# Patient Record
Sex: FEMALE
[De-identification: names, ages, dates, MRNs, and addresses within clinical notes are randomized per-mention and may not be internally consistent; named-entity substitution may affect disease eponyms.]

---

## 2018-06-12 LAB
ALBUMIN SERPL-MCNC: 3.7 G/DL (ref 3.5–5)
ALBUMIN/GLOB SERPL: 1.1 {RATIO} (ref 0.8–2)
ALP SERPL-CCNC: 63 IU/L (ref 40–150)
ALT SERPL-CCNC: 23 IU/L (ref 0–55)
ANION GAP SERPL CALC-SCNC: 12.8 MMOL/L (ref 8–16)
BASOPHILS # BLD AUTO: 0 10*3/UL (ref 0–0.1)
BASOPHILS NFR BLD AUTO: 0.6 % (ref 0–1)
BUN SERPL-MCNC: 15 MG/DL (ref 7–26)
BUN/CREAT SERPL: 18 (ref 6–25)
CALCIUM SERPL-MCNC: 9.5 MG/DL (ref 8.4–10.2)
CHLORIDE SERPL-SCNC: 97 MMOL/L (ref 98–107)
CHOLEST SERPL-MCNC: 216 MD/DL (ref 0–199)
CHOLEST/HDLC SERPL: 4.8 {RATIO} (ref 3–3.6)
CO2 SERPL-SCNC: 30 MMOL/L (ref 22–29)
DEPRECATED INR PLAS: 0.99
DEPRECATED NEUTROPHILS # BLD AUTO: 3.7 10*3/UL (ref 2.1–6.9)
EGFRCR SERPLBLD CKD-EPI 2021: > 60 ML/MIN (ref 60–?)
EOSINOPHIL # BLD AUTO: 0.1 10*3/UL (ref 0–0.4)
EOSINOPHIL NFR BLD AUTO: 2 % (ref 0–6)
ERYTHROCYTE [DISTWIDTH] IN CORD BLOOD: 13 % (ref 11.7–14.4)
GLOBULIN PLAS-MCNC: 3.4 G/DL (ref 2.3–3.5)
GLUCOSE SERPLBLD-MCNC: 139 MG/DL (ref 74–118)
HCT VFR BLD AUTO: 35.9 % (ref 34.2–44.1)
HDLC SERPL-MSCNC: 45 MG/DL (ref 40–60)
HGB BLD-MCNC: 12.3 G/DL (ref 12–16)
LDLC SERPL CALC-MCNC: 122 MG/DL (ref 60–130)
LYMPHOCYTES # BLD: 2.4 10*3/UL (ref 1–3.2)
LYMPHOCYTES NFR BLD AUTO: 35.3 % (ref 18–39.1)
MCH RBC QN AUTO: 31.5 PG (ref 28–32)
MCHC RBC AUTO-ENTMCNC: 34.3 G/DL (ref 31–35)
MCV RBC AUTO: 91.8 FL (ref 81–99)
MONOCYTES # BLD AUTO: 0.5 10*3/UL (ref 0.2–0.8)
MONOCYTES NFR BLD AUTO: 7.4 % (ref 4.4–11.3)
NEUTS SEG NFR BLD AUTO: 54.4 % (ref 38.7–80)
PLATELET # BLD AUTO: 236 X10E3/UL (ref 140–360)
POTASSIUM SERPL-SCNC: 3.8 MMOL/L (ref 3.5–5.1)
PROTHROMBIN TIME: 12.3 SECONDS (ref 11.9–14.5)
RBC # BLD AUTO: 3.91 X10E6/UL (ref 3.6–5.1)
SODIUM SERPL-SCNC: 136 MMOL/L (ref 136–145)
TRIGL SERPL-MCNC: 244 MG/DL (ref 0–149)

## 2018-06-13 ENCOUNTER — HOSPITAL ENCOUNTER (OUTPATIENT)
Dept: HOSPITAL 88 - CATH LAB | Age: 68
Discharge: HOME | End: 2018-06-13
Attending: INTERNAL MEDICINE
Payer: MEDICARE

## 2018-06-13 VITALS — HEIGHT: 62 IN | BODY MASS INDEX: 30.18 KG/M2 | WEIGHT: 164 LBS

## 2018-06-13 VITALS — SYSTOLIC BLOOD PRESSURE: 139 MMHG | DIASTOLIC BLOOD PRESSURE: 64 MMHG

## 2018-06-13 VITALS — SYSTOLIC BLOOD PRESSURE: 127 MMHG | DIASTOLIC BLOOD PRESSURE: 54 MMHG

## 2018-06-13 VITALS — DIASTOLIC BLOOD PRESSURE: 56 MMHG | SYSTOLIC BLOOD PRESSURE: 136 MMHG

## 2018-06-13 VITALS — SYSTOLIC BLOOD PRESSURE: 141 MMHG | DIASTOLIC BLOOD PRESSURE: 59 MMHG

## 2018-06-13 VITALS — DIASTOLIC BLOOD PRESSURE: 72 MMHG | SYSTOLIC BLOOD PRESSURE: 170 MMHG

## 2018-06-13 VITALS — DIASTOLIC BLOOD PRESSURE: 63 MMHG | SYSTOLIC BLOOD PRESSURE: 126 MMHG

## 2018-06-13 VITALS — DIASTOLIC BLOOD PRESSURE: 73 MMHG | SYSTOLIC BLOOD PRESSURE: 177 MMHG

## 2018-06-13 VITALS — DIASTOLIC BLOOD PRESSURE: 59 MMHG | SYSTOLIC BLOOD PRESSURE: 129 MMHG

## 2018-06-13 VITALS — DIASTOLIC BLOOD PRESSURE: 57 MMHG | SYSTOLIC BLOOD PRESSURE: 138 MMHG

## 2018-06-13 DIAGNOSIS — Z95.1: ICD-10-CM

## 2018-06-13 DIAGNOSIS — I83.893: ICD-10-CM

## 2018-06-13 DIAGNOSIS — I70.213: ICD-10-CM

## 2018-06-13 DIAGNOSIS — Z95.820: ICD-10-CM

## 2018-06-13 DIAGNOSIS — Z95.5: ICD-10-CM

## 2018-06-13 DIAGNOSIS — I25.708: Primary | ICD-10-CM

## 2018-06-13 DIAGNOSIS — Z01.810: ICD-10-CM

## 2018-06-13 DIAGNOSIS — I70.92: ICD-10-CM

## 2018-06-13 DIAGNOSIS — R94.39: ICD-10-CM

## 2018-06-13 DIAGNOSIS — Z79.02: ICD-10-CM

## 2018-06-13 DIAGNOSIS — E78.5: ICD-10-CM

## 2018-06-13 DIAGNOSIS — R01.1: ICD-10-CM

## 2018-06-13 DIAGNOSIS — Z01.812: ICD-10-CM

## 2018-06-13 DIAGNOSIS — E11.8: ICD-10-CM

## 2018-06-13 DIAGNOSIS — Z79.84: ICD-10-CM

## 2018-06-13 DIAGNOSIS — E66.9: ICD-10-CM

## 2018-06-13 DIAGNOSIS — I25.82: ICD-10-CM

## 2018-06-13 DIAGNOSIS — I10: ICD-10-CM

## 2018-06-13 DIAGNOSIS — Z79.82: ICD-10-CM

## 2018-06-13 DIAGNOSIS — Z82.49: ICD-10-CM

## 2018-06-13 PROCEDURE — 36200 PLACE CATHETER IN AORTA: CPT

## 2018-06-13 PROCEDURE — 37228: CPT

## 2018-06-13 PROCEDURE — 93459 L HRT ART/GRFT ANGIO: CPT

## 2018-06-13 PROCEDURE — 36140 INTRO NDL ICATH UPR/LXTR ART: CPT

## 2018-06-13 PROCEDURE — 75630 X-RAY AORTA LEG ARTERIES: CPT

## 2018-06-13 PROCEDURE — 80061 LIPID PANEL: CPT

## 2018-06-13 PROCEDURE — 85610 PROTHROMBIN TIME: CPT

## 2018-06-13 PROCEDURE — 75710 ARTERY X-RAYS ARM/LEG: CPT

## 2018-06-13 PROCEDURE — 93005 ELECTROCARDIOGRAM TRACING: CPT

## 2018-06-13 PROCEDURE — 37232: CPT

## 2018-06-13 PROCEDURE — 80053 COMPREHEN METABOLIC PANEL: CPT

## 2018-06-13 PROCEDURE — 36415 COLL VENOUS BLD VENIPUNCTURE: CPT

## 2018-06-13 PROCEDURE — 85025 COMPLETE CBC W/AUTO DIFF WBC: CPT

## 2018-06-13 NOTE — OPERATIVE REPORT
DATE OF PROCEDURE:  June 13, 2018 

 

PROCEDURE PERFORMED:   Cardiac catheterization.



PROCEDURE INDICATIONS

1. Angina pectoris, CCS-III, stable with an abnormal stress test done at 

outside hospital.

2. Life-limiting claudication affecting left more than right lower 

extremity.



PROCEDURES PERFORMED

1. Left heart catheterization.

2. Selective coronary angiography.

3. Left internal mammary artery to left anterior descending graft 

angiography.

4. Aortogram of descending aorta.

5. Abdominal aortogram.

6. Selective lower extremity angiography, bilateral.

7. Third-order catheter placement from right common femoral artery to 

left common femoral artery.

8. Additional third-order catheter placement from right common femoral 

artery to the left popliteal artery for below-the-knee vessel 

angiography of the left lower extremity due to an issue with poorly 

visualized vessel.

9. Left anterior tibial percutaneous transluminal angioplasty

10. Right common femoral artery 6-Bhutanese Angio-Seal closure.



PROCEDURE COMPLICATIONS:  None.



ESTIMATED BLOOD LOSS:  Less than 15 mL.



PROCEDURE SUMMARY:  After consent was obtained, patient was prepped and 

draped in a sterile fashion and the right femoral site was locally 

infiltrated with 2% lidocaine. Access was obtained with micropuncture, and 

a short 6-Bhutanese sheath was placed. All catheters were railed to their 

respective positions over a leading wire for the interventional portion of 

the procedure, and heparin was used to maintain an ACT over 250. Aspirin 

and Plavix were also provided. 



1. The LV pressure was 201/12 with end-diastolic pressure of 30. 

2. The aortic pressure was 197/74. 

3. LV-gram reveals preserved LV systolic function, normal regional wall 

motion and a left ventricular ejection fraction of 60% to 65%. 

4. Left main large in caliber with a 20% distal stenosis gives an LAD 

and a circumflex.

5. The LAD has proximal luminal irregularities. In the distal portion 

after a diagonal stent, the LAD has 100% stenosis. Distal to the 

stenosis there is patent filling of the LAD from a LIMA to LAD which is 

patent. Collaterals arise from the distal LAD fed by the LIMA. These 

collaterals fill the RPDA and RPLV and are grade 2 collaterals. 

6. The circumflex has 40% mid calcific stenosis, gives a left atrial 

branch and 2 obtuse marginals.

7. The right coronary artery is dominant. In the proximal to midportion 

it is 100% stenosed with distal filling of the midsegment of the RCA 

from bridging collaterals from the proximal RCA. The distal RCA is 

occluded again 100%. Distal to this the RPDA and RPLV fill from 

collaterals on the distal LAD via the LIMA.

8. No additional grafts were noted other than an occluded graft to RCA 

observed on aortogram. No other patent grafts other than the LIMA, which 

is patent, were observed.



The abdominal aorta infrarenal has a 40% extrinsic calcific lesion and 

gives off the iliac arteries. The iliac arteries each have 30% stenosis. 

There is a back orthopedic hardware noted. Therefore, orthogonal views were 

performed to confirm patency of the iliac arteries. The internal and 

external iliac arteries have luminal irregularities filled with the common 

femoral and profunda femoris, SFAs and popliteal arteries bilaterally with 

a stenosis less than 30% _______. The proximal segment of the right 

anterior tibial, TP trunk, peroneal and posterior tibial artery seems 

patent. However, the mid to distal segment of these vessels are not well 

visualized. The left anterior tibial in the midportion had 100% chronic 

total occlusion. This was revascularized with a resultant less than 10% 

residual stenosis, no dissection, no perforation, SHARON 3 flow post 

procedure. TP trunk, peroneal artery and posterior tibial artery on the 

left are patent with luminal irregularities. For the interventional portion 

of the procedure, a Roadrunner wire was used to advance a Seeker catheter 

into the proximal stent of the left anterior tibial. This was exchanged for 

a Runthrough wire, which would continue to engage collaterals at the  

proximal _______. This was, therefore, exchanged further for a _______ 

wire, which was able to cross adequately the area of stenosis. A Seeker 

catheter was advanced past the occlusion and intraluminal catheter position 

confirmed; therefore, exchanged for a [place] wire over which a 3.0 x 

150 Ultraverse balloon was inflated across the area of stenosis with 

resultant excellent results as described above. At the end of the 

procedure, a right common femoral artery 6-Bhutanese Angio-Seal was deployed, 

achieving hemostasis.



CONCLUSION:  Patent left internal mammary artery to left anterior 

descending. Chronic total occlusion of the right coronary artery with 

filling via collaterals. Uncontrolled hypertension and peripheral arterial 

disease, now status post left anterior tibial percutaneous transluminal 

angioplasty.



RECOMMENDATIONS:  Aggressive medical therapy. Continue medical management 

for coronary artery disease and peripheral arterial disease including 

aspirin, statin, beta blocker therapy. Add clopidogrel for patient status 

post revascularization of the left anterior tibial. Follow up in the office 

in 1 to 2 weeks. 











DD:  06/13/2018 17:40

DT:  06/13/2018 18:02

Job#:  [id number]

## 2018-10-25 ENCOUNTER — HOSPITAL ENCOUNTER (OUTPATIENT)
Dept: HOSPITAL 88 - CT | Age: 68
End: 2018-10-25
Attending: INTERNAL MEDICINE
Payer: MEDICARE

## 2018-10-25 DIAGNOSIS — I73.9: Primary | ICD-10-CM

## 2018-10-25 LAB
BUN SERPL-MCNC: 11 MG/DL (ref 7–26)
BUN/CREAT SERPL: 14 (ref 6–25)
EGFRCR SERPLBLD CKD-EPI 2021: > 60 ML/MIN (ref 60–?)

## 2018-10-25 PROCEDURE — 36415 COLL VENOUS BLD VENIPUNCTURE: CPT

## 2018-10-25 PROCEDURE — 84520 ASSAY OF UREA NITROGEN: CPT

## 2018-10-25 PROCEDURE — 82565 ASSAY OF CREATININE: CPT

## 2018-10-25 PROCEDURE — 75635 CT ANGIO ABDOMINAL ARTERIES: CPT

## 2018-10-26 NOTE — DIAGNOSTIC IMAGING REPORT
CTA of the abdomen and pelvis and bilateral lower extremities with contrast



History: Leg pain, history of lower extremity angiography 06/2018.



Comparison: <None available>.



Technique: Multidetector CT scanning of the abdomen and pelvis and bilateral

lower extremities was administered in arterial phase after intravenous

administration of 100 cc of Isovue 370.  No oral contrast was given. Sagittal

and coronal multiplanar as well as volume rendering reformations were obtained.





Discussion:

Vascular findings:

Abdominal aorta and proximal branches: 

Streak artifact from spinal hardware somewhat limits evaluation of the mid

abdominal aorta. There are extensive atherosclerotic calcifications involving

the abdominal aorta, and moderate atherosclerotic calcifications of the branch

vessels. No evidence of aortic aneurysm or dissection. 



Celiac artery, SMA, and SANG origins appear patent. There are two renal arteries

bilaterally. The bilateral common, external, and internal iliac arteries appear

patent. 



Bilateral lower extremity runoff: 

Right lower extremity: Mild atherosclerotic changes of the common femoral

artery without stenosis. Moderate atherosclerotic changes of the superficial

femoral artery with a focal severe stenosis on axial image 160 and mild to

moderate multifocal stenoses elsewhere. The deep femoral artery is patent. The

popliteal artery demonstrates mild atherosclerotic changes without stenosis.

The peroneal artery is patent throughout its course but diminutive distally.

The posterior tibial artery is patent throughout its course. Mild non-specific

soft tissue stranding in the lower leg. 



Left lower extremity: Mild atherosclerotic changes of the common and

superficial femoral arteries without stenosis. The deep femoral artery is

patent. The popliteal artery demonstrates mild atherosclerotic changes without

stenosis. Anterior tibial artery demonstrates moderate to severe stenoses in

the proximal and mid portion but is patent distally and supplies a patent

dorsalis pedis artery. The peroneal artery is patent in the proximal and mid

portion but not visualized distally at the level of the distal tibia. The

posterior tibial artery demonstrates mild multifocal stenoses but is patent to

the foot. There are surgical clips along the soft tissues of the left lower leg

medially. Mild non-specific soft tissue stranding in the lower leg. 



Abdomen:

Scattered dependent atelectatic changes. Four chamber cardiomegaly. Coronary

atherosclerosis. 



No focal hepatic lesions or biliary ductal dilatation. Status post

cholecystectomy. 



The spleen, pancreas, adrenal glands, and right kidney are normal. There is a

renal cortical defect on the left. The small bowel, and large bowel are

unremarkable. Small hiatal hernia. Fat containing umbilical hernia. Normal

appendix. There is no evidence of adenopathy or free fluid. 



Posterior decompression with spinal fusion from L3-L5 with intervertebral

spacers with extensive streak artifact. No evidence of acute bony abnormality. 



Pelvis:

The bladder is unremarkable. There is no evidence of adenopathy or free fluid.

Fibroid uterus. 



IMPRESSION:

1) Extensive atherosclerotic changes of the abdominal aorta without evidence of

aneurysm or dissection. 

2) Focal severe stenosis of the right superficial femoral artery. Patent three

vessel runoff to the right lower extremity. 

3) Mild to moderate atherosclerotic changes in the left lower extremity.

Anterior and posterior tibial arteries supply the runoff to the left lower

extremity. Patent proximal and mid peroneal artery with non-visualization of

the distal portion which may reflect occlusion or slow flow. 



Signed by: Dr. Mo Barahona MD on 10/26/2018 7:56 AM

## 2018-11-07 LAB
ALBUMIN SERPL-MCNC: 3.6 G/DL (ref 3.5–5)
ALBUMIN/GLOB SERPL: 1.2 {RATIO} (ref 0.8–2)
ALP SERPL-CCNC: 51 IU/L (ref 40–150)
ALT SERPL-CCNC: 15 IU/L (ref 0–55)
ANION GAP SERPL CALC-SCNC: 13.8 MMOL/L (ref 8–16)
BASOPHILS # BLD AUTO: 0 10*3/UL (ref 0–0.1)
BASOPHILS NFR BLD AUTO: 0.7 % (ref 0–1)
BUN SERPL-MCNC: 13 MG/DL (ref 7–26)
BUN/CREAT SERPL: 16 (ref 6–25)
CALCIUM SERPL-MCNC: 9.6 MG/DL (ref 8.4–10.2)
CHLORIDE SERPL-SCNC: 96 MMOL/L (ref 98–107)
CHOLEST SERPL-MCNC: 213 MD/DL (ref 0–199)
CHOLEST/HDLC SERPL: 4.5 {RATIO} (ref 3–3.6)
CO2 SERPL-SCNC: 27 MMOL/L (ref 22–29)
DEPRECATED INR PLAS: 0.89
DEPRECATED NEUTROPHILS # BLD AUTO: 3 10*3/UL (ref 2.1–6.9)
EGFRCR SERPLBLD CKD-EPI 2021: > 60 ML/MIN (ref 60–?)
EOSINOPHIL # BLD AUTO: 0.1 10*3/UL (ref 0–0.4)
EOSINOPHIL NFR BLD AUTO: 1.9 % (ref 0–6)
ERYTHROCYTE [DISTWIDTH] IN CORD BLOOD: 12.6 % (ref 11.7–14.4)
GLOBULIN PLAS-MCNC: 3.1 G/DL (ref 2.3–3.5)
GLUCOSE SERPLBLD-MCNC: 123 MG/DL (ref 74–118)
HCT VFR BLD AUTO: 35 % (ref 34.2–44.1)
HDLC SERPL-MSCNC: 47 MG/DL (ref 40–60)
HGB BLD-MCNC: 11.7 G/DL (ref 12–16)
LDLC SERPL CALC-MCNC: 131 MG/DL (ref 60–130)
LYMPHOCYTES # BLD: 1.9 10*3/UL (ref 1–3.2)
LYMPHOCYTES NFR BLD AUTO: 32.5 % (ref 18–39.1)
MCH RBC QN AUTO: 30.8 PG (ref 28–32)
MCHC RBC AUTO-ENTMCNC: 33.4 G/DL (ref 31–35)
MCV RBC AUTO: 92.1 FL (ref 81–99)
MONOCYTES # BLD AUTO: 0.7 10*3/UL (ref 0.2–0.8)
MONOCYTES NFR BLD AUTO: 11.3 % (ref 4.4–11.3)
NEUTS SEG NFR BLD AUTO: 52.9 % (ref 38.7–80)
PLATELET # BLD AUTO: 234 X10E3/UL (ref 140–360)
POTASSIUM SERPL-SCNC: 3.8 MMOL/L (ref 3.5–5.1)
PROTHROMBIN TIME: 12.9 SECONDS (ref 11.9–14.5)
RBC # BLD AUTO: 3.8 X10E6/UL (ref 3.6–5.1)
SODIUM SERPL-SCNC: 133 MMOL/L (ref 136–145)
TRIGL SERPL-MCNC: 176 MG/DL (ref 0–149)

## 2018-11-08 ENCOUNTER — HOSPITAL ENCOUNTER (OUTPATIENT)
Dept: HOSPITAL 88 - CATH LAB | Age: 68
Discharge: HOME | End: 2018-11-08
Attending: INTERNAL MEDICINE
Payer: MEDICARE

## 2018-11-08 VITALS — HEIGHT: 61 IN | BODY MASS INDEX: 33.04 KG/M2 | WEIGHT: 175 LBS

## 2018-11-08 VITALS — DIASTOLIC BLOOD PRESSURE: 58 MMHG | SYSTOLIC BLOOD PRESSURE: 115 MMHG

## 2018-11-08 VITALS — DIASTOLIC BLOOD PRESSURE: 53 MMHG | SYSTOLIC BLOOD PRESSURE: 135 MMHG

## 2018-11-08 VITALS — SYSTOLIC BLOOD PRESSURE: 124 MMHG | DIASTOLIC BLOOD PRESSURE: 53 MMHG

## 2018-11-08 VITALS — DIASTOLIC BLOOD PRESSURE: 67 MMHG | SYSTOLIC BLOOD PRESSURE: 134 MMHG

## 2018-11-08 VITALS — DIASTOLIC BLOOD PRESSURE: 64 MMHG | SYSTOLIC BLOOD PRESSURE: 104 MMHG

## 2018-11-08 VITALS — DIASTOLIC BLOOD PRESSURE: 65 MMHG | SYSTOLIC BLOOD PRESSURE: 123 MMHG

## 2018-11-08 VITALS — DIASTOLIC BLOOD PRESSURE: 57 MMHG | SYSTOLIC BLOOD PRESSURE: 144 MMHG

## 2018-11-08 VITALS — SYSTOLIC BLOOD PRESSURE: 156 MMHG | DIASTOLIC BLOOD PRESSURE: 67 MMHG

## 2018-11-08 VITALS — DIASTOLIC BLOOD PRESSURE: 54 MMHG | SYSTOLIC BLOOD PRESSURE: 123 MMHG

## 2018-11-08 VITALS — SYSTOLIC BLOOD PRESSURE: 137 MMHG | DIASTOLIC BLOOD PRESSURE: 79 MMHG

## 2018-11-08 DIAGNOSIS — I10: ICD-10-CM

## 2018-11-08 DIAGNOSIS — Z82.49: ICD-10-CM

## 2018-11-08 DIAGNOSIS — I70.213: Primary | ICD-10-CM

## 2018-11-08 DIAGNOSIS — E11.69: ICD-10-CM

## 2018-11-08 DIAGNOSIS — Z79.82: ICD-10-CM

## 2018-11-08 DIAGNOSIS — E66.9: ICD-10-CM

## 2018-11-08 DIAGNOSIS — Z01.812: ICD-10-CM

## 2018-11-08 DIAGNOSIS — Z79.02: ICD-10-CM

## 2018-11-08 DIAGNOSIS — Z79.84: ICD-10-CM

## 2018-11-08 DIAGNOSIS — E78.5: ICD-10-CM

## 2018-11-08 DIAGNOSIS — I83.893: ICD-10-CM

## 2018-11-08 DIAGNOSIS — Z01.810: ICD-10-CM

## 2018-11-08 DIAGNOSIS — I25.118: ICD-10-CM

## 2018-11-08 PROCEDURE — 85025 COMPLETE CBC W/AUTO DIFF WBC: CPT

## 2018-11-08 PROCEDURE — 36247 INS CATH ABD/L-EXT ART 3RD: CPT

## 2018-11-08 PROCEDURE — 75625 CONTRAST EXAM ABDOMINL AORTA: CPT

## 2018-11-08 PROCEDURE — 37225: CPT

## 2018-11-08 PROCEDURE — 85610 PROTHROMBIN TIME: CPT

## 2018-11-08 PROCEDURE — 80053 COMPREHEN METABOLIC PANEL: CPT

## 2018-11-08 PROCEDURE — 37186 SEC ART THROMBECTOMY ADD-ON: CPT

## 2018-11-08 PROCEDURE — 75716 ARTERY X-RAYS ARMS/LEGS: CPT

## 2018-11-08 PROCEDURE — 36415 COLL VENOUS BLD VENIPUNCTURE: CPT

## 2018-11-08 PROCEDURE — 80061 LIPID PANEL: CPT

## 2018-11-08 PROCEDURE — 93005 ELECTROCARDIOGRAM TRACING: CPT

## 2018-11-08 NOTE — XMS REPORT
Patient Summary Document

                             Created on: 2018



PANCHO NINO

External Reference #: [id number]

: 1950

Sex: Female



Demographics







                          Address                   [address] [place] [address]

 

                          Home Phone                [phone]

 

                          Preferred Language        Unknown

 

                          Marital Status            Unknown

 

                          Buddhist Affiliation     Unknown

 

                          Race                      Unknown

 

                                        Additional Race(s)  

 

                          Ethnic Group              Unknown





Author







                          Author                    [facility]

 

                          [facility]

 

                          Address                   Unknown

 

                          Phone                     Unavailable







Care Team Providers







                    Care Team Member Name    Role                Phone

 

                    PAUL HUGHES     Unavailable         Unavailable







Problems

This patient has no known problems.



Allergies, Adverse Reactions, Alerts

This patient has no known allergies or adverse reactions.



Medications

This patient has no known medications.



Results







           Test Description    Test Time    Test Comments    Text Results    Atomic Results    Result

 Comments

 

                CTA ABD/PEL/RUN OFF    2018-10-26 07:27:00                                                       

                                                   [place] [address]      Patient Name: PANCHO NINO                                   MR 
#: [id number]                     : 1950                               
   Age/Sex: 68/F  Acct #: [id number]                              Req #: [id number]
[id number]  Adm Physician:                                                      
Ordered by: HEIKE HUGHES MD                            Report #: [id number]   
    Location: CT                                      Room/Bed:                 
   
___________________________________________________________________________________________________
   Procedure: [id number] CT/CTA ABD/PEL/RUN OFF  Exam Date: 10/25/18             
              Exam Time: 1700                                              
REPORT STATUS: Signed    CTA of the abdomen and pelvis and bilateral lower 
extremities with contrast      History: Leg pain, history of lower extremity 
angiography 2018.      Comparison: <None available>.      Technique: 
Multidetector CT scanning of the abdomen and pelvis and bilateral   lower 
extremities was administered in arterial phase after intravenous   
administration of 100 cc of Isovue 370.  No oral contrast was given. Sagittal   
and coronal multiplanar as well as volume rendering reformations were obtained. 
       Discussion:   Vascular findings:   Abdominal aorta and proximal branches:
   Streak artifact from spinal hardware somewhat limits evaluation of the mid   
abdominal aorta. There are extensive atherosclerotic calcifications involving   
the abdominal aorta, and moderate atherosclerotic calcifications of the branch  
vessels. No evidence of aortic aneurysm or dissection.       Celiac artery, SMA,
and SANG origins appear patent. There are two renal arteries   bilaterally. The 
bilateral common, external, and internal iliac arteries appear   patent.       
Bilateral lower extremity runoff:    Right lower extremity: Mild atherosclerotic
changes of the common femoral   artery without stenosis. Moderate 
atherosclerotic changes of the superficial   femoral artery with a focal severe 
stenosis on axial image 160 and mild to   moderate multifocal stenoses 
elsewhere. The deep femoral artery is patent. The   popliteal artery 
demonstrates mild atherosclerotic changes without stenosis.   The peroneal 
artery is patent throughout its course but diminutive distally.   The posterior 
tibial artery is patent throughout its course. Mild non-specific   soft tissue 
stranding in the lower leg.       Left lower extremity: Mild atherosclerotic 
changes of the common and   superficial femoral arteries without stenosis. The 
deep femoral artery is   patent. The popliteal artery demonstrates mild 
atherosclerotic changes without   stenosis. Anterior tibial artery demonstrates 
moderate to severe stenoses in   the proximal and mid portion but is patent 
distally and supplies a patent   dorsalis pedis artery. The peroneal artery is 
patent in the proximal and mid   portion but not visualized distally at the 
level of the distal tibia. The   posterior tibial artery demonstrates mild 
multifocal stenoses but is patent to   the foot. There are surgical clips along 
the soft tissues of the left lower leg   medially. Mild non-specific soft tissue
stranding in the lower leg.       Abdomen:   Scattered dependent atelectatic 
changes. Four chamber cardiomegaly. Coronary   atherosclerosis.       No focal 
hepatic lesions or biliary ductal dilatation. Status post   cholecystectomy.    
  The spleen, pancreas, adrenal glands, and right kidney are normal. There is a 
 renal cortical defect on the left. The small bowel, and large bowel are   
unremarkable. Small hiatal hernia. Fat containing umbilical hernia. Normal   
appendix. There is no evidence of adenopathy or free fluid.       Posterior 
decompression with spinal fusion from L3-L5 with intervertebral   spacers with 
extensive streak artifact. No evidence of acute bony abnormality.       Pelvis: 
 The bladder is unremarkable. There is no evidence of adenopathy or free fluid. 
 Fibroid uterus.       IMPRESSION:   1) Extensive atherosclerotic changes of the
abdominal aorta without evidence of   aneurysm or dissection.    2) Focal severe
stenosis of the right superficial femoral artery. Patent three   vessel runoff 
to the right lower extremity.    3) Mild to moderate atherosclerotic changes in 
the left lower extremity.   Anterior and posterior tibial arteries supply the 
runoff to the left lower   extremity. Patent proximal and mid peroneal artery 
with non-visualization of   the distal portion which may reflect occlusion or 
slow flow.       Signed by: Dr. Farhan Arndt MD on 10/26/2018 7:56 AM        
Dictated By: FARHAN ARNDT MD  Electronically Signed By: FARHAN ARNDT MD on 10/26/18 
0756  Transcribed By: MARIA C on 10/26/18 0756       COPY TO:   HEIKE HUGHES MD

## 2018-11-09 NOTE — OPERATIVE REPORT
DATE OF PROCEDURE:  November 08, 2018 

 

PROCEDURES PERFORMED

1. Conscious sedation 1 hour.

2. Abdominal aortography.

3. Third-order peripheral angiography of the right lower extremity.

4. First-order peripheral angiography of the left lower extremity.

5. Orbital atherectomy and percutaneous transluminal angioplasty of the 

right superficial femoral artery.



PREPROCEDURE DIAGNOSIS:   Peripheral arterial disease with claudication.



POSTPROCEDURE DIAGNOSIS:   Peripheral arterial disease with claudication.



ESTIMATED BLOOD LOSS:  Less than 20 mL.



SPECIMENS REMOVED:  None.



PROCEDURE DETAILS:  After informed consent was obtained, the patient was 

brought to the cardiac catheterization laboratory in a fasting and 

nonsedated state. Bilateral groins were prepped and draped in the usual 

sterile fashion. Lidocaine 2% was infiltrated over the left anterior groin 

for local anesthesia. Using a micropuncture needle, the left common femoral 

artery was accessed using modified Seldinger technique and a 5-Barbadian 

sheath was placed. Next, an Omni Flush catheter was advanced over the wire 

and situated in the infrarenal abdominal aorta. Abdominal aortography was 

performed. Next, the same catheter was used to cross up and over into the 

right superficial femoral artery position, and diagnostic angiography was 

performed. This revealed a significant 80% to 90% stenosis of the right 

superficial femoral artery. A decision was made to treat this lesion. Next, 

I crossed an up-and-over 6-Barbadian sheath. The lesion was crossed with a 

ViperWire, and multiple runs of orbital atherectomy were performed. Next, 

the lesion was predilated with a 5 mm balloon, then subsequently with a 5 

mm Lutonix drug-coated balloon. Final angiography revealed a mild to 

non-flow-limiting dissection which will be treated medically. The sheath 

was exchanged. The closure site was closed with a Mynx device. The patient 

tolerated the procedure well with no immediate complications and was 

transferred back to her room in stable condition.



PROCEDURAL FINDINGS

1. The infrarenal abdominal aorta is free of aneurysm or dissection.

2. The iliac system is patent.

3. The right common femoral, profunda femoris are patent. The mid right 

SFA has an 80% to 90% stenosis. 

4. The right popliteal artery is patent, and there is 3-vessel runoff.

5. The left femoral, popliteal system is patent. The left anterior 

tibial has a 50% to 60% mid stenosis. The left posterior tibial has a 

50% to 60% mid stenosis. The left peroneal is patent.



IMPRESSION AND PLAN:   This is a 68-year-old woman who presented with 

claudication symptoms and known peripheral arterial disease. She underwent 

successful atherectomy and percutaneous transluminal angioplasty of the 

right superficial femoral artery. 











DD:  11/09/2018 09:48

DT:  11/09/2018 11:31

Job#:  [id number]

## 2019-04-22 ENCOUNTER — HOSPITAL ENCOUNTER (OUTPATIENT)
Dept: HOSPITAL 88 - CT | Age: 69
End: 2019-04-22
Attending: INTERNAL MEDICINE
Payer: MEDICARE

## 2019-04-22 DIAGNOSIS — I73.9: Primary | ICD-10-CM

## 2019-04-22 LAB
BUN SERPL-MCNC: 21 MG/DL (ref 7–26)
BUN/CREAT SERPL: 24 (ref 6–25)
EGFRCR SERPLBLD CKD-EPI 2021: > 60 ML/MIN (ref 60–?)

## 2019-04-22 PROCEDURE — 36415 COLL VENOUS BLD VENIPUNCTURE: CPT

## 2019-04-22 PROCEDURE — 84520 ASSAY OF UREA NITROGEN: CPT

## 2019-04-22 PROCEDURE — 75635 CT ANGIO ABDOMINAL ARTERIES: CPT

## 2019-04-22 PROCEDURE — 82565 ASSAY OF CREATININE: CPT

## 2019-04-22 NOTE — DIAGNOSTIC IMAGING REPORT
EXAMINATION: CTA abdomen and pelvis and lower extremities with contrast.



TECHNIQUE: 

Spiral CT images of the abdomen and pelvis were performed from the lung bases

to the feet after the intravenous administration of 100 cc Isovue-370. Coronal

and sagittal reformatted images were obtained.



For optimization of anatomic definition, volume rendered 3-D reconstructed

images were generated on a stand-alone workstation under direct supervision of

the interpreting physician.



COMPARISON: 10/25/2018



CLINICAL HISTORY:Peripheral vascular disease

     

DISCUSSION:



Vasculature: The abdominal aorta is not aneurysmal. Atherosclerotic

calcification of the abdominal aorta and major branch vessel origins. Celiac

axis is patent with conventional celiac anatomy. SMA origin is patent. 2 renal

arteries supply each kidney, the origins of which are patent. The SANG origin is

patent. The infrarenal abdominal aorta is slightly limited just above the

bifurcation secondary to streak artifact from lumbar spine hardware.



Iliac inflow:

Left: Calcified and noncalcified atherosclerotic plaque throughout without

significant common or external iliac stenosis. The internal iliac artery and

proximal visceral branches are patent.

Right: Calcified atherosclerotic plaque without significant stenosis of the

common or external iliac artery. Internal iliac artery and proximal visceral

branches are patent.



Femoral-popliteal segments:

Left: The common femoral artery is patent. The femoral bifurcation is patent.

The profunda femoris artery and proximal muscular branches are patent. There is

short segment mild stenosis of the proximal SFA. Otherwise diffuse calcified

and noncalcified atherosclerotic plaque of the SFA without significant

stenosis. The above-knee and below-knee segments of popliteal artery are patent

Right: Common femoral artery is patent. The femoral bifurcation is patent. The

profunda femoris artery and proximal muscular branches are patent. Calcified

and noncalcified atherosclerotic plaque along the course of the SFA. A short

segment moderate stenosis proximally. Previously described focal severe

stenosis of the SFA at the adductor hiatus has probably been treated by

angioplasty in the interim, without significant residual stenosis (see image

153). The popliteal artery is patent.



Runoff:

Left: The anterior tibial artery is patent and is visualized to its

continuation as the dorsalis pedis artery. The tibioperoneal trunk is patent.

The peroneal artery is patent to its expected termination at the ankle joint.

The posterior tibial artery is patent to its continuation as the lateral

plantar artery.

Right: The anterior tibial artery is patent and is visualized to its

continuation as the dorsalis pedis artery. The tibioperoneal trunk is patent.

The peroneal artery is patent to its termination above the ankle joint.

Posterior tibial artery is patent and is visualized to its continuation as the

lateral plantar artery.



ABDOMEN/PELVIS:



LOWER THORAX:Unremarkable.



HEPATOBILIARY: No focal hepatic lesions.  No intra-or extrahepatic biliary

ductal dilation.  Gallbladder has been resected. 



SPLEEN: Heterogeneity of splenic attenuation reflects arterial phase of scan.



PANCREAS: No focal masses or ductal dilatation. 



ADRENALS: No adrenal nodules.



KIDNEYS/URETERS: Multiple cortical defects in the posterior left kidney which

may relate to prior infectious or inflammatory process. No solid renal mass

lesion. No hydronephrosis.



PELVIC ORGANS/BLADDER: The urinary bladder is incompletely distended but

otherwise unremarkable. Calcified fundal uterine fibroid. No adnexal mass.



PERITONEUM/RETROPERITONEUM: No ascites. No pneumoperitoneum.



LYMPH NODES: No inguinal, pelvic sidewall, retroperitoneal, or mesenteric

lymphadenopathy.



VESSELS: As above.



GI TRACT: The large bowel shows no evidence of distention or wall thickening.

There are a few diverticula scattered along the course of the sigmoid colon

without wall thickening or inflammatory change. Apparent wall thickening of the

descending colon is likely related to poor distention. The appendix is normal.

Small hiatal hernia. The stomach is collapsed with prominent rugal folds. No

small bowel dilatation to suggest obstruction.



BONES AND SOFT TISSUE: Postsurgical changes related to mesh repair of ventral

abdominal wall hernia. Right mid abdominal paramedian ventral hernia with a

neck measuring 4.8 cm transversely seen on series 3 image 77. Hernia sac

contains only omental vessels without inflammatory change. Multiple surgical

clips in the medial soft tissues of the left upper leg, likely related to

venous graft harvest. No osseous destructive lesions. Intact lumbar spine

fusion hardware.     



IMPRESSION: 



Diffuse atherosclerotic vascular disease of the abdominal, pelvic and lower

extremity systems. No abdominal aortic aneurysm or significant visceral branch

stenosis.



No significant iliac inflow stenosis.



Probable interval angioplasty of previously described short segment severe

stenosis of the right superficial femoral artery at the adductor hiatus,

without significant residual stenosis. Otherwise no significant

femoral-popliteal stenosis.



Conventional three-vessel lower extremity runoff bilaterally.



Signed by: Dr. Jose Garza M.D. on 4/22/2019 2:27 PM

## 2020-03-16 LAB
ALBUMIN SERPL-MCNC: 3.8 G/DL (ref 3.5–5)
ALBUMIN/GLOB SERPL: 1.2 {RATIO} (ref 0.8–2)
ALP SERPL-CCNC: 57 IU/L (ref 40–150)
ALT SERPL-CCNC: 13 IU/L (ref 0–55)
ANION GAP SERPL CALC-SCNC: 9 MMOL/L (ref 8–16)
BASOPHILS # BLD AUTO: 0 10*3/UL (ref 0–0.1)
BASOPHILS NFR BLD AUTO: 0.5 % (ref 0–1)
BUN SERPL-MCNC: 17 MG/DL (ref 7–26)
BUN/CREAT SERPL: 20 (ref 6–25)
CALCIUM SERPL-MCNC: 8.8 MG/DL (ref 8.4–10.2)
CHLORIDE SERPL-SCNC: 104 MMOL/L (ref 98–107)
CO2 SERPL-SCNC: 28 MMOL/L (ref 22–29)
DEPRECATED INR PLAS: 0.92
DEPRECATED NEUTROPHILS # BLD AUTO: 3.5 10*3/UL (ref 2.1–6.9)
EGFRCR SERPLBLD CKD-EPI 2021: > 60 ML/MIN (ref 60–?)
EOSINOPHIL # BLD AUTO: 0.2 10*3/UL (ref 0–0.4)
EOSINOPHIL NFR BLD AUTO: 2.4 % (ref 0–6)
ERYTHROCYTE [DISTWIDTH] IN CORD BLOOD: 13.4 % (ref 11.7–14.4)
GLOBULIN PLAS-MCNC: 3.2 G/DL (ref 2.3–3.5)
GLUCOSE SERPLBLD-MCNC: 149 MG/DL (ref 74–118)
HCT VFR BLD AUTO: 33.4 % (ref 34.2–44.1)
HGB BLD-MCNC: 10.9 G/DL (ref 12–16)
LYMPHOCYTES # BLD: 1.8 10*3/UL (ref 1–3.2)
LYMPHOCYTES NFR BLD AUTO: 29.2 % (ref 18–39.1)
MCH RBC QN AUTO: 30.4 PG (ref 28–32)
MCHC RBC AUTO-ENTMCNC: 32.6 G/DL (ref 31–35)
MCV RBC AUTO: 93 FL (ref 81–99)
MONOCYTES # BLD AUTO: 0.8 10*3/UL (ref 0.2–0.8)
MONOCYTES NFR BLD AUTO: 12 % (ref 4.4–11.3)
NEUTS SEG NFR BLD AUTO: 55.4 % (ref 38.7–80)
PLATELET # BLD AUTO: 234 X10E3/UL (ref 140–360)
POTASSIUM SERPL-SCNC: 4 MMOL/L (ref 3.5–5.1)
PROTHROMBIN TIME: 12.9 SECONDS (ref 11.9–14.5)
RBC # BLD AUTO: 3.59 X10E6/UL (ref 3.6–5.1)
SODIUM SERPL-SCNC: 137 MMOL/L (ref 136–145)

## 2020-03-16 NOTE — NUR
Checked patient temperature 98.2F via skin probe.

Patient denies being out of the country in the last 14 days. Patient denies being in contact 
with anyone who is a "confirmed [place] virus patient" in the last 14 days.

Patient denies fever, cough or shortness of breath in the last 14 days.

## 2020-03-18 ENCOUNTER — HOSPITAL ENCOUNTER (OUTPATIENT)
Dept: HOSPITAL 88 - CATH LAB | Age: 70
Discharge: HOME | End: 2020-03-18
Attending: INTERNAL MEDICINE
Payer: MEDICARE

## 2020-03-18 VITALS — DIASTOLIC BLOOD PRESSURE: 50 MMHG | SYSTOLIC BLOOD PRESSURE: 136 MMHG

## 2020-03-18 VITALS — BODY MASS INDEX: 34.16 KG/M2 | HEIGHT: 60 IN | WEIGHT: 174 LBS

## 2020-03-18 VITALS — DIASTOLIC BLOOD PRESSURE: 70 MMHG | SYSTOLIC BLOOD PRESSURE: 131 MMHG

## 2020-03-18 VITALS — DIASTOLIC BLOOD PRESSURE: 42 MMHG | SYSTOLIC BLOOD PRESSURE: 121 MMHG

## 2020-03-18 VITALS — DIASTOLIC BLOOD PRESSURE: 77 MMHG | SYSTOLIC BLOOD PRESSURE: 128 MMHG

## 2020-03-18 VITALS — SYSTOLIC BLOOD PRESSURE: 141 MMHG | DIASTOLIC BLOOD PRESSURE: 52 MMHG

## 2020-03-18 VITALS — DIASTOLIC BLOOD PRESSURE: 66 MMHG | SYSTOLIC BLOOD PRESSURE: 122 MMHG

## 2020-03-18 VITALS — DIASTOLIC BLOOD PRESSURE: 63 MMHG | SYSTOLIC BLOOD PRESSURE: 178 MMHG

## 2020-03-18 DIAGNOSIS — I73.9: ICD-10-CM

## 2020-03-18 DIAGNOSIS — E66.9: ICD-10-CM

## 2020-03-18 DIAGNOSIS — Z01.812: ICD-10-CM

## 2020-03-18 DIAGNOSIS — Z82.49: ICD-10-CM

## 2020-03-18 DIAGNOSIS — Z95.1: ICD-10-CM

## 2020-03-18 DIAGNOSIS — E78.5: ICD-10-CM

## 2020-03-18 DIAGNOSIS — I35.0: ICD-10-CM

## 2020-03-18 DIAGNOSIS — Z79.84: ICD-10-CM

## 2020-03-18 DIAGNOSIS — E11.69: ICD-10-CM

## 2020-03-18 DIAGNOSIS — I10: ICD-10-CM

## 2020-03-18 DIAGNOSIS — I83.893: ICD-10-CM

## 2020-03-18 DIAGNOSIS — Z79.82: ICD-10-CM

## 2020-03-18 DIAGNOSIS — I25.708: Primary | ICD-10-CM

## 2020-03-18 DIAGNOSIS — Z79.02: ICD-10-CM

## 2020-03-18 PROCEDURE — 85025 COMPLETE CBC W/AUTO DIFF WBC: CPT

## 2020-03-18 PROCEDURE — 36415 COLL VENOUS BLD VENIPUNCTURE: CPT

## 2020-03-18 PROCEDURE — 80053 COMPREHEN METABOLIC PANEL: CPT

## 2020-03-18 PROCEDURE — 93455 CORONARY ART/GRFT ANGIO S&I: CPT

## 2020-03-18 PROCEDURE — 99153 MOD SED SAME PHYS/QHP EA: CPT

## 2020-03-18 PROCEDURE — 99152 MOD SED SAME PHYS/QHP 5/>YRS: CPT

## 2020-03-18 PROCEDURE — 85610 PROTHROMBIN TIME: CPT

## 2020-03-18 NOTE — XMS REPORT
[facility] Services Summary

                             Created on: 2019



Yanelis Elam

External Reference #: [id number]

: 1950

Sex: Female



Demographics







                          Address                   [address]

[place]  [address]

 

                          Home Phone                [phone]

 

                          Preferred Language        Unknown

 

                          Marital Status            M

 

                          Hoahaoism Affiliation     Unknown

 

                          Race                      Unknown

 

                          Ethnic Group              Unknown





Author







                          Author                    Admin, [place]

 

                          Organization              [facility]

 

                          Address                   [address] [place] [address]  [id number]



 

                          Phone                     [phone]







Allergies, Adverse Reactions, Alerts







           Allergy Name    Reaction Description    Start Date    Severity    Status     Provider

 

           No Known Allergies                                         Na Smith







Conditions or Problems







        Problem Name    Problem Code    Onset Date    Status    Entry Date    Provider    Comment    Standard

 Description                            Annotate

 

        Problems Unknown                                                             







Medication List







        Medication    Instructions    Start Date    Stop Date    Generic Name    NDC     Status    Provider

                                        Patient Instruction

 

        No Drug Therapy Prescribed - none known did ask                                                Na Smith                                   







Vital Signs







           Date       Name       Value      Unit       Range      Description

 

               blood pressure, diastolic    53         mm[Hg]                BP muro

 

               blood pressure, systolic    117        mm[Hg]                BP sys

 

               pulse rate E&M    61         /min                  Heart rate

## 2020-03-18 NOTE — XMS REPORT
Patient Summary Document

                             Created on: 2020



PANCHO NINO

External Reference #: [id number]

: 1950

Sex: Female



Demographics







                          Address                   [address] [place] [address]

 

                          Home Phone                [phone]

 

                          Preferred Language        Unknown

 

                          Marital Status            Unknown

 

                          Quaker Affiliation     Unknown

 

                          Race                      Unknown

 

                                        Additional Race(s) 

 

 

                          Ethnic Group              Unknown





Author







                          Author                    [facility]

 

                          Address                   Unknown

 

                          Phone                     Unavailable







Care Team Providers







                    Care Team Member Name    Role                Phone

 

                    PAUL HUGHES     Unavailable         Unavailable

 

                    BETH SOSA        Unavailable         Unavailable

 

                    IVETH ARDON    Unavailable         Unavailable







Problems

This patient has no known problems.



Allergies, Adverse Reactions, Alerts

This patient has no known allergies or adverse reactions.



Medications

This patient has no known medications.



Encounters







             Start Date/Time    End Date/Time    Encounter Type    Admission Type    Attending [facility] Facility       Care Department     Encounter ID

 

        2019 21:03:00    2019 21:03:00    Emergency    E               [facility]    [facility]    [id number]







Results







           Test Description    Test Time    Test Comments    Text Results    Atomic Results    Result

 Comments

 

                CTA ABD/PEL/RUN OFF    2019 14:11:00                                                       

                                                   [place] [address]      Patient Name: PANCHO NINO                                   MR 
#: [id number]                     : 1950                               
   Age/Sex: 68/F  Acct #: [id number]                              Req #: [id number]
[id number]  Adm Physician:                                                      
Ordered by: HEIKE HUGHES MD                            Report #: [id number]   
    Location: CT                                      Room/Bed:                 
   
___________________________________________________________________________________________________
   Procedure: [id number] CT/CTA ABD/PEL/RUN OFF  Exam Date: 19             
              Exam Time: 1300                                              
REPORT STATUS: Signed    EXAMINATION: CTA abdomen and pelvis and lower 
extremities with contrast.      TECHNIQUE:    Spiral CT images of the abdomen 
and pelvis were performed from the lung bases   to the feet after the 
intravenous administration of 100 cc Isovue-370. Coronal   and sagittal 
reformatted images were obtained.      For optimization of anatomic definition, 
volume rendered 3-D reconstructed   images were generated on a stand-alone 
workstation under direct supervision of   the interpreting physician.      
COMPARISON: 10/25/2018      CLINICAL HISTORY:Peripheral vascular disease        
  DISCUSSION:      Vasculature: The abdominal aorta is not aneurysmal. 
Atherosclerotic   calcification of the abdominal aorta and major branch vessel 
origins. Celiac   axis is patent with conventional celiac anatomy. SMA origin is
patent. 2 renal   arteries supply each kidney, the origins of which are patent. 
The SANG origin is   patent. The infrarenal abdominal aorta is slightly limited 
just above the   bifurcation secondary to streak artifact from lumbar spine 
hardware.      Iliac inflow:   Left: Calcified and noncalcified atherosclerotic 
plaque throughout without   significant common or external iliac stenosis. The 
internal iliac artery and   proximal visceral branches are patent.   Right: 
Calcified atherosclerotic plaque without significant stenosis of the   common or
external iliac artery. Internal iliac artery and proximal visceral   branches 
are patent.      Femoral-popliteal segments:   Left: The common femoral artery 
is patent. The femoral bifurcation is patent.   The profunda femoris artery and 
proximal muscular branches are patent. There is   short segment mild stenosis of
the proximal SFA. Otherwise diffuse calcified   and noncalcified atherosclerotic
plaque of the SFA without significant   stenosis. The above-knee and below-knee 
segments of popliteal artery are patent   Right: Common femoral artery is 
patent. The femoral bifurcation is patent. The   profunda femoris artery and 
proximal muscular branches are patent. Calcified   and noncalcified 
atherosclerotic plaque along the course of the SFA. A short   segment moderate 
stenosis proximally. Previously described focal severe   stenosis of the SFA at 
the adductor hiatus has probably been treated by   angioplasty in the interim, 
without significant residual stenosis (see image   153). The popliteal artery is
patent.      Runoff:   Left: The anterior tibial artery is patent and is 
visualized to its   continuation as the dorsalis pedis artery. The tibioperoneal
trunk is patent.   The peroneal artery is patent to its expected termination at 
the ankle joint.   The posterior tibial artery is patent to its continuation as 
the lateral   plantar artery.   Right: The anterior tibial artery is patent and 
is visualized to its   continuation as the dorsalis pedis artery. The 
tibioperoneal trunk is patent.   The peroneal artery is patent to its 
termination above the ankle joint.   Posterior tibial artery is patent and is 
visualized to its continuation as the   lateral plantar artery.      
ABDOMEN/PELVIS:      LOWER THORAX:Unremarkable.      HEPATOBILIARY: No focal 
hepatic lesions.  No intra-or extrahepatic biliary   ductal dilation.  
Gallbladder has been resected.       SPLEEN: Heterogeneity of splenic 
attenuation reflects arterial phase of scan.      PANCREAS: No focal masses or 
ductal dilatation.       ADRENALS: No adrenal nodules.      KIDNEYS/URETERS: 
Multiple cortical defects in the posterior left kidney which   may relate to 
prior infectious or inflammatory process. No solid renal mass   lesion. No 
hydronephrosis.      PELVIC ORGANS/BLADDER: The urinary bladder is incompletely 
distended but   otherwise unremarkable. Calcified fundal uterine fibroid. No 
adnexal mass.      PERITONEUM/RETROPERITONEUM: No ascites. No pneumoperitoneum. 
    LYMPH NODES: No inguinal, pelvic sidewall, retroperitoneal, or mesenteric   
lymphadenopathy.      VESSELS: As above.      GI TRACT: The large bowel shows no
evidence of distention or wall thickening.   There are a few diverticula 
scattered along the course of the sigmoid colon   without wall thickening or 
inflammatory change. Apparent wall thickening of the   descending colon is 
likely related to poor distention. The appendix is normal.   Small hiatal 
hernia. The stomach is collapsed with prominent rugal folds. No   small bowel 
dilatation to suggest obstruction.      BONES AND SOFT TISSUE: Postsurgical 
changes related to mesh repair of ventral   abdominal wall hernia. Right mid 
abdominal paramedian ventral hernia with a   neck measuring 4.8 cm transversely 
seen on series 3 image 77. Hernia sac   contains only omental vessels without 
inflammatory change. Multiple surgical   clips in the medial soft tissues of the
left upper leg, likely related to   venous graft harvest. No osseous destructive
lesions. Intact lumbar spine   fusion hardware.           IMPRESSION:       
Diffuse atherosclerotic vascular disease of the abdominal, pelvic and lower   
extremity systems. No abdominal aortic aneurysm or significant visceral branch  
stenosis.      No significant iliac inflow stenosis.      Probable interval 
angioplasty of previously described short segment severe   stenosis of the right
superficial femoral artery at the adductor hiatus,   without significant 
residual stenosis. Otherwise no significant   femoral-popliteal stenosis.      
Conventional three-vessel lower extremity runoff bilaterally.      Signed by: 
Dr. Robby Armstrong M.D. on 2019 2:27 PM        Dictated By: ROBBY ARMSTRONG MD  Electronically Signed By: ROBBY ARMSTRONG MD on 19  Transcribed By:
MARIA C on 19       COPY TO:   HEIKE HUGHES MD              

 

                    RAD, CHEST, 1 VIEW, NON DEPT    2019 12:58:00    Reason for exam:->DIZZINESSReason

 for exam:->EMESISReason for exam:->DIARRHEA    FINAL REPORT PATIENT ID:   [id number]

  AP view of the chest dated 3/25/2019 CLINICAL INFORMATION: 
DIZZINESSEMESISDIARRHEA Comment:  Heart is enlarged. Pulmonary vasculature is 
unremarkable. A 1.6 cm nodule is seen in the right lower lobe. The rest of the 
lungs are clear. No pulmonary infiltrate or pleural effusion is present.  
Impression: Right lower lobe nodule. Recommend follow-up with CT of the chest. 
Signed: Steve Contreras MDReport Verified Date/Time:  2019 12:58:44 Reading 
Location: [facility] C0[facility] Consult Reading Room      Electronically signed by: STEVE CONTRERAS M.D. on 2019 12:58 PM      

 

                TROPONIN I      2019 11:41:00                      

 

   

 

                TROPONIN I (BEAKER) (test code=397)    < ng/mL         0.00-0.03        





Troponin I (TnI) levels must be interpreted in the context of the presenting sym
ptoms and the clinical findings. Elevated TnI levels indicate myocardial damage,
but are not specific for ischemic heart disease. Elevated TnI levels are seen in
patients with other cardiac conditions (including myocarditis and congestive h
eart failure), and slight TnI elevations occur in patients with other conditions
, including sepsis, renal failure, acidosis, acute neurological disease, and per
sistent tachyarrhythmia.[id number] 11:34:00* 





                Test Item       Value           Reference Range    Comments

 

                MAGNESIUM (BEAKER) (test code=627)    1.8 mg/dL       1.6-2.6          





BASIC METABOLIC [id number] 11:34:00* 





                Test Item       Value           Reference Range    Comments

 

                SODIUM (BEAKER) (test code=381)    138 meq/L       136-145          

 

                POTASSIUM (BEAKER) (test code=379)    3.4 meq/L       3.5-5.1          

 

                CHLORIDE (BEAKER) (test code=382)    104 meq/L                  

 

                CO2 (BEAKER) (test code=355)    23 meq/L        22-29            

 

                BLOOD UREA NITROGEN (BEAKER) (test code=354)    17 mg/dL        7-21             

 

                CREATININE (BEAKER) (test code=358)    0.85 mg/dL      0.57-1.25        

 

                GLUCOSE RANDOM (BEAKER) (test code=652)    137 mg/dL                  

 

                CALCIUM (BEAKER) (test code=697)    8.1 mg/dL       8.4-10.2         

 

                EGFR (BEAKER) (test code=1092)    66 mL/min/1.73 sq m                    INSUFFICIENT CLINICAL DATA

 TO CALCULATE ESTIMATED GFR.





HEPATIC FUNCTION [id number] 11:34:00* 





                Test Item       Value           Reference Range    Comments

 

                TOTAL PROTEIN (BEAKER) (test code=770)    6.3 gm/dL       6.0-8.3          

 

                ALBUMIN (BEAKER) (test code=1145)    3.6 g/dL        3.5-5.0          

 

                BILIRUBIN TOTAL (BEAKER) (test code=377)    0.5 mg/dL       0.2-1.2          

 

                BILIRUBIN DIRECT (BEAKER) (test code=706)    0.2 mg/dL       0.1-0.5          

 

                ALKALINE PHOSPHATASE (BEAKER) (test code=346)    59 U/L                     

 

                AST (SGOT) (BEAKER) (test code=353)    16 U/L          5-34             

 

                ALT (SGPT) (BEAKER) (test code=347)    17 U/L          6-55             





[id number] 11:34:00* 





                Test Item       Value           Reference Range    Comments

 

                AMYLASE (BEAKER) (test code=349)    25 U/L                     





[id number] 11:34:00* 





                Test Item       Value           Reference Range    Comments

 

                LIPASE (BEAKER) (test code=749)    18 U/L          8-78             





[id number] 11:29:00* 





                Test Item       Value           Reference Range    Comments

 

                PROTIME (BEAKER) (test code=759)    13.6 seconds    11.7-14.7        

 

                INR (BEAKER) (test code=370)    1.0             <=5.9            

 

                PARTIAL THROMBOPLASTIN TIME (BEAKER) (test code=760)    25.8 seconds    22.5-36.0        







RECOMMENDED COUMADIN/WARFARIN INR THERAPY RANGESSTANDARD DOSE: 2.0 - 3.0   Inclu
agustin: PROPHYLAXIS for venous thrombosis, systemic embolization; TREATMENT for adam
ous thrombosis and/or pulmonary embolus.HIGH RISK: Target INR is 2.5-3.5 for pat
ients with mechanical heart valves.CBC W/PLT COUNT & AUTO [id number]
11:17:00* 





                Test Item       Value           Reference Range    Comments

 

                WHITE BLOOD CELL COUNT (BEAKER) (test code=775)    6.5 K/ L        3.5-10.5         

 

                RED BLOOD CELL COUNT (BEAKER) (test code=761)    3.73 M/ L       3.93-5.22        

 

                HEMOGLOBIN (BEAKER) (test code=410)    11.0 GM/DL      11.2-15.7        

 

                HEMATOCRIT (BEAKER) (test code=411)    34.1 %          34.1-44.9        

 

                MEAN CORPUSCULAR VOLUME (BEAKER) (test code=753)    91.4 fL         79.4-94.8        

 

                MEAN CORPUSCULAR HEMOGLOBIN (BEAKER) (test code=751)    29.5 pg         25.6-32.2        

 

                    MEAN CORPUSCULAR HEMOGLOBIN CONC (BEAKER) (test code=752)    32.3 GM/DL          32.2-35.5

                                         

 

                RED CELL DISTRIBUTION WIDTH (BEAKER) (test code=412)    13.7 %          11.7-14.4        

 

                PLATELET COUNT (BEAKER) (test code=756)    194 K/CU MM     150-450          

 

                MEAN PLATELET VOLUME (BEAKER) (test code=754)    10.3 fL         9.4-12.3         

 

                NUCLEATED RED BLOOD CELLS (BEAKER) (test code=413)    0 /100 WBC      0-0              

 

                NEUTROPHILS RELATIVE PERCENT (BEAKER) (test code=429)    74 %                             

 

                LYMPHOCYTES RELATIVE PERCENT (BEAKER) (test code=430)    17 %                             

 

                MONOCYTES RELATIVE PERCENT (BEAKER) (test code=431)    7 %                              

 

                EOSINOPHILS RELATIVE PERCENT (BEAKER) (test code=432)    1 %                              

 

                BASOPHILS RELATIVE PERCENT (BEAKER) (test code=437)    0 %                              

 

                NEUTROPHILS ABSOLUTE COUNT (BEAKER) (test code=670)    4.83 K/ L       1.56-6.13        

 

                LYMPHOCYTES ABSOLUTE COUNT (BEAKER) (test code=414)    1.09 K/ L       1.18-3.74        

 

                MONOCYTES ABSOLUTE COUNT (BEAKER) (test code=415)    0.48 K/ L       0.24-0.36        

 

                EOSINOPHILS ABSOLUTE COUNT (BEAKER) (test code=416)    0.09 K/ L       0.04-0.36        

 

                BASOPHILS ABSOLUTE COUNT (BEAKER) (test code=417)    0.02 K/ L       0.01-0.08        

 

                IMMATURE GRANULOCYTES-RELATIVE PERCENT (BEJHONY) (test code=2801)    0 %             0-1              





CTA ABD/PEL/RUN OFF2018-10-26 07:27:00                                          
                                           [facility]    
                   [address] [place] [address]    
 Patient Name: PANCHO NINO                                   MR #: [id number]    
                : 1950                                   Age/Sex: 68/F 
Acct #: [id number]                              Req #: [id number]  Adm 
Physician:                                                      Ordered by: 
HEIKE HUGHES MD                            Report #: [id number]        
Location: CT                                      Room/Bed:                     
___________________________________________________
________________________________________________    Procedure: [id number] CT/CTA 
ABD/PEL/RUN OFF  Exam Date: 10/25/18                            Exam Time: 1700 
                                            REPORT STATUS: Signed    CTA of the 
abdomen and pelvis and bilateral lower extremities with contrast      History: 
Leg pain, history of lower extremity angiography 2018.      Comparison: <None
available>.      Technique: Multidetector CT scanning of the abdomen and pelvis 
and bilateral   lower extremities was administered in arterial phase after 
intravenous   administration of 100 cc of Isovue 370.  No oral contrast was 
given. Sagittal   and coronal multiplanar as well as volume rendering 
reformations were obtained.         Discussion:   Vascular findings:   Abdominal
aorta and proximal branches:    Streak artifact from spinal hardware somewhat 
limits evaluation of the mid   abdominal aorta. There are extensive 
atherosclerotic calcifications involving   the abdominal aorta, and moderate 
atherosclerotic calcifications of the branch   vessels. No evidence of aortic 
aneurysm or dissection.       Celiac artery, SMA, and SANG origins appear patent.
There are two renal arteries   bilaterally. The bilateral common, external, and 
internal iliac arteries appear   patent.       Bilateral lower extremity runoff:
   Right lower extremity: Mild atherosclerotic changes of the common femoral   
artery without stenosis. Moderate atherosclerotic changes of the superficial   
femoral artery with a focal severe stenosis on axial image 160 and mild to   
moderate multifocal stenoses elsewhere. The deep femoral artery is patent. The  
popliteal artery demonstrates mild atherosclerotic changes without stenosis.   
The peroneal artery is patent throughout its course but diminutive distally.   
The posterior tibial artery is patent throughout its course. Mild non-specific  
soft tissue stranding in the lower leg.       Left lower extremity: Mild 
atherosclerotic changes of the common and   superficial femoral arteries without
stenosis. The deep femoral artery is   patent. The popliteal artery demonstrates
mild atherosclerotic changes without   stenosis. Anterior tibial artery 
demonstrates moderate to severe stenoses in   the proximal and mid portion but 
is patent distally and supplies a patent   dorsalis pedis artery. The peroneal 
artery is patent in the proximal and mid   portion but not visualized distally 
at the level of the distal tibia. The   posterior tibial artery demonstrates 
mild multifocal stenoses but is patent to   the foot. There are surgical clips 
along the soft tissues of the left lower leg   medially. Mild non-specific soft 
tissue stranding in the lower leg.       Abdomen:   Scattered dependent 
atelectatic changes. Four chamber cardiomegaly. Coronary   atherosclerosis.     
 No focal hepatic lesions or biliary ductal dilatation. Status post   cholecy
stectomy.       The spleen, pancreas, adrenal glands, and right kidney are debra
l. There is a   renal cortical defect on the left. The small bowel, and large aravind
wel are   unremarkable. Small hiatal hernia. Fat containing umbilical hernia. No
rmal   appendix. There is no evidence of adenopathy or free fluid.       Posteri
or decompression with spinal fusion from L3-L5 with intervertebral   spacers wit
h extensive streak artifact. No evidence of acute bony abnormality.       Pelvis
:   The bladder is unremarkable. There is no evidence of adenopathy or free flui
d.   Fibroid uterus.       IMPRESSION:   1) Extensive atherosclerotic changes of
the abdominal aorta without evidence of   aneurysm or dissection.    2) Focal s
evere stenosis of the right superficial femoral artery. Patent three   vessel ru
noff to the right lower extremity.    3) Mild to moderate atherosclerotic change
s in the left lower extremity.   Anterior and posterior tibial arteries supply t
he runoff to the left lower   extremity. Patent proximal and mid peroneal artery
with non-visualization of   the distal portion which may reflect occlusion or s
low flow.       Signed by: Dr. Farhan Arndt MD on 10/26/2018 7:56 AM        Dicta
rodrigo By: FARHAN ARNDT MD  Electronically Signed By: FARHAN ARNDT MD on 10/26/18 569 
Transcribed By: MARIA C on 10/26/18 0756       COPY TO:   HEIKE HUGHES MD     
  POCT-GLUCOSE [id number] 12:53:00* 





                Test Item       Value           Reference Range    Comments

 

                POC-GLUCOSE METER (BEAKER) (test code=1538)    231 mg/dL                 TESTED AT [facility] 

[address] [address]





POCT-GLUCOSE [id number] 08:02:00* 





                Test Item       Value           Reference Range    Comments

 

                POC-GLUCOSE METER (BEAKER) (test code=1538)    244 mg/dL                 TESTED AT [facility] 

[address] [address]





[id number] 07:22:00* 





                Test Item       Value           Reference Range    Comments

 

                PHOSPHORUS (BEAKER) (test code=604)    3.1 mg/dL       2.3-4.7          





[id number] 07:22:00* 





                Test Item       Value           Reference Range    Comments

 

                MAGNESIUM (BEAKER) (test code=627)    1.8 mg/dL       1.6-2.6          





BASIC METABOLIC [id number] 07:22:00* 





                Test Item       Value           Reference Range    Comments

 

                SODIUM (BEAKER) (test code=381)    131 meq/L       136-145          

 

                POTASSIUM (BEAKER) (test code=379)    3.8 meq/L       3.5-5.1          

 

                CHLORIDE (BEAKER) (test code=382)    94 meq/L                   

 

                CO2 (BEAKER) (test code=355)    25 meq/L        22-29            

 

                BLOOD UREA NITROGEN (BEAKER) (test code=354)    15 mg/dL        7-21             

 

                CREATININE (BEAKER) (test code=358)    0.79 mg/dL      0.57-1.25        

 

                GLUCOSE RANDOM (BEAKER) (test code=652)    195 mg/dL                  

 

                CALCIUM (BEAKER) (test code=697)    8.9 mg/dL       8.4-10.2         

 

                EGFR (BEAKER) (test code=1092)    72 mL/min/1.73 sq m                    ESTIMATED GFR IS NOT AS 

ACCURATE AS CREATININE CLEARANCE IN PREDICTING GLOMERULAR FILTRATION RATE. 
ESTIMATED GFR IS NOT APPLICABLE FOR DIALYSIS PATIENTS.





HEPATIC FUNCTION [id number] 07:22:00* 





                Test Item       Value           Reference Range    Comments

 

                TOTAL PROTEIN (BEAKER) (test code=770)    6.9 gm/dL       6.0-8.3          

 

                ALBUMIN (BEAKER) (test code=1145)    3.8 g/dL        3.5-5.0          

 

                BILIRUBIN TOTAL (BEAKER) (test code=377)    1.1 mg/dL       0.2-1.2          

 

                BILIRUBIN DIRECT (BEAKER) (test code=706)    0.3 mg/dL       0.1-0.5          

 

                ALKALINE PHOSPHATASE (BEAKER) (test code=346)    68 U/L                     

 

                AST (SGOT) (BEAKER) (test code=353)    20 U/L          5-34             

 

                ALT (SGPT) (BEAKER) (test code=347)    26 U/L          6-55             





CBC W/PLT COUNT & AUTO [id number] 07:01:00* 





                Test Item       Value           Reference Range    Comments

 

                WHITE BLOOD CELL COUNT (BEAKER) (test code=775)    6.2 K/ L        3.5-10.5         

 

                RED BLOOD CELL COUNT (BEAKER) (test code=761)    3.93 M/ L       3.93-5.22        

 

                HEMOGLOBIN (BEAKER) (test code=410)    12.3 GM/DL      11.2-15.7        

 

                HEMATOCRIT (BEAKER) (test code=411)    36.1 %          34.1-44.9        

 

                MEAN CORPUSCULAR VOLUME (BEAKER) (test code=753)    91.9 fL         79.4-94.8        

 

                MEAN CORPUSCULAR HEMOGLOBIN (BEAKER) (test code=751)    31.3 pg         25.6-32.2        

 

                    MEAN CORPUSCULAR HEMOGLOBIN CONC (BEAKER) (test code=752)    34.1 GM/DL          32.2-35.5

                                         

 

                RED CELL DISTRIBUTION WIDTH (BEAKER) (test code=412)    12.6 %          11.7-14.4        

 

                PLATELET COUNT (BEAKER) (test code=756)    200 K/CU MM     150-450          

 

                MEAN PLATELET VOLUME (BEAKER) (test code=754)    10.4 fL         9.4-12.3         

 

                NUCLEATED RED BLOOD CELLS (BEAKER) (test code=413)    0 /100 WBC      0-0              

 

                NEUTROPHILS RELATIVE PERCENT (BEAKER) (test code=429)    55 %                             

 

                LYMPHOCYTES RELATIVE PERCENT (BEAKER) (test code=430)    31 %                             

 

                MONOCYTES RELATIVE PERCENT (BEAKER) (test code=431)    9 %                              

 

                EOSINOPHILS RELATIVE PERCENT (BEAKER) (test code=432)    4 %                              

 

                BASOPHILS RELATIVE PERCENT (BEAKER) (test code=437)    1 %                              

 

                NEUTROPHILS ABSOLUTE COUNT (BEAKER) (test code=670)    3.44 K/ L       1.56-6.13        

 

                LYMPHOCYTES ABSOLUTE COUNT (BEAKER) (test code=414)    1.95 K/ L       1.18-3.74        

 

                MONOCYTES ABSOLUTE COUNT (BEAKER) (test code=415)    0.54 K/ L       0.24-0.36        

 

                EOSINOPHILS ABSOLUTE COUNT (BEAKER) (test code=416)    0.23 K/ L       0.04-0.36        

 

                BASOPHILS ABSOLUTE COUNT (BEAKER) (test code=417)    0.03 K/ L       0.01-0.08        

 

                IMMATURE GRANULOCYTES-RELATIVE PERCENT (BEAKER) (test code=2801)    0 %             0-1              





POCT-GLUCOSE [id number] 21:37:00* 





                Test Item       Value           Reference Range    Comments

 

                POC-GLUCOSE METER (BEAKER) (test code=1538)    208 mg/dL                 TESTED AT [facility] 

[address] [facility] [address]





PET, CARDIAC PERFUSION MULTIPLE STUDIES, REST AND [id number] 17:18:00
Reason for exam:->chest pain elevated troponinFINAL REPORT PATIENT ID:   
[id number] PROCEDURE:    Rest/Stress MYOCARDIAL PERFUSION PET with 
regadenoson\XA9\CPT CODE:         [id number]:       chest pain  
R07.9PROTOCOL:       Limited low-dose CT imaging was performed for attenuation 
correction. 40.1 mCi of Rb-82 chloride was injected iv at rest, and gated PET (p
ositron emission tomography) images were obtained. Subsequently, 40.1 mCi of Rb-
82 chloride was injected iv at expected peak pharmacologic effect, and gated PET
images were obtained.  PRELIMINARY STRESS TEST DATA FROM NONINVASIVE CARDIOLOGY:
 Pharmacologic stress was by 10-second iv infusion of 0.4 mg of regadenoson. R
adiotracer was injected 30 seconds after start of stress. Heart rate was 46 beat
s/min at rest and 60 beats/min (39% of MPHR) at tracer injection. BP was 123/52 
mmHg at rest and 124/48 mmHg at tracer injection. Stress was stopped for predete
rmined endpoint. The patient experienced no symptoms; treatment was not required
. Preliminary ECG evaluation was not available from Cardiology at the time of th
is report. (Final ECG interpretation and other stress and monitoring data are re
ported separately by Cardiology.)  IMAGING FINDINGS:    Study quality is good. I
mages obtained after stress injection show mild decrease in activity in the apic
al lateral and mid anterolateral and mid inferolateral LV. Resting images show n
ormal distribution. LV volume appears normal. RV volume appears normal. Gated im
ages obtained immediately after stress show normal LV wall motion. Gated images 
obtained at rest show normal LV wall motion. LVEF at rest is >65%. LVEF at 
stress is >65%. IMPRESSION:   1. Abnormal study.  2. Appropriate pharmacologic 
stress.  3. Abnormal myocardial perfusion.  There is a mild severity, medium 
size, reversible, perfusion defect in the lateral LV.  4. Normal resting LV 
function. No  deterioration with pharmacologic stress.  5. Extracardiac tracer 
distribution is normal.  6. No prior study.   Signed: Tracy Clayton MDReport 
Verified Date/Time:  2018 17:18:40    Electronically signed by: TRACY CLAYTON MD on 2018 05:18 PM POCT-GLUCOSE [id number] 17:02:00
  * 





                Test Item       Value           Reference Range    Comments

 

                POC-GLUCOSE METER (BEAKER) (test code=1538)    187 mg/dL                 TESTED AT [facility] 

[address] [facility] [address] [address]





POCT-GLUCOSE [id number] 08:08:00* 





                Test Item       Value           Reference Range    Comments

 

                POC-GLUCOSE METER (BEAKER) (test code=1538)    210 mg/dL                 TESTED AT [place] [id number] [facility] [address]





[id number] 05:26:00* 





                Test Item       Value           Reference Range    Comments

 

                PHOSPHORUS (BEAKER) (test code=604)    3.0 mg/dL       2.3-4.7          





[id number] 05:26:00* 





                Test Item       Value           Reference Range    Comments

 

                MAGNESIUM (BEAKER) (test code=627)    1.7 mg/dL       1.6-2.6          





BASIC METABOLIC [id number] 05:26:00* 





                Test Item       Value           Reference Range    Comments

 

                SODIUM (BEAKER) (test code=381)    132 meq/L       136-145          

 

                POTASSIUM (BEAKER) (test code=379)    3.9 meq/L       3.5-5.1          

 

                CHLORIDE (BEAKER) (test code=382)    94 meq/L                   

 

                CO2 (BEAKER) (test code=355)    30 meq/L        22-29            

 

                BLOOD UREA NITROGEN (BEAKER) (test code=354)    16 mg/dL        7-21             

 

                CREATININE (BEAKER) (test code=358)    0.78 mg/dL      0.57-1.25        

 

                GLUCOSE RANDOM (BEAKER) (test code=652)    190 mg/dL                  

 

                CALCIUM (BEAKER) (test code=697)    8.8 mg/dL       8.4-10.2         

 

                EGFR (BEAKER) (test code=1092)    73 mL/min/1.73 sq m                    ESTIMATED GFR IS NOT AS 

ACCURATE AS CREATININE CLEARANCE IN PREDICTING GLOMERULAR FILTRATION RATE. 
ESTIMATED GFR IS NOT APPLICABLE FOR DIALYSIS PATIENTS.





HEPATIC FUNCTION [id number] 05:26:00* 





                Test Item       Value           Reference Range    Comments

 

                TOTAL PROTEIN (BEAKER) (test code=770)    6.0 gm/dL       6.0-8.3          

 

                ALBUMIN (BEAKER) (test code=1145)    3.4 g/dL        3.5-5.0          

 

                BILIRUBIN TOTAL (BEAKER) (test code=377)    0.8 mg/dL       0.2-1.2          

 

                BILIRUBIN DIRECT (BEAKER) (test code=706)    0.3 mg/dL       0.1-0.5          

 

                ALKALINE PHOSPHATASE (BEAKER) (test code=346)    63 U/L                     

 

                AST (SGOT) (BEAKER) (test code=353)    16 U/L          5-34             

 

                ALT (SGPT) (BEAKER) (test code=347)    24 U/L          6-55             





CBC W/PLT COUNT & AUTO [id number] 04:45:00* 





                Test Item       Value           Reference Range    Comments

 

                WHITE BLOOD CELL COUNT (BEAKER) (test code=775)    6.2 K/ L        3.5-10.5         

 

                RED BLOOD CELL COUNT (BEAKER) (test code=761)    3.62 M/ L       3.93-5.22        

 

                HEMOGLOBIN (BEAKER) (test code=410)    11.0 GM/DL      11.2-15.7        

 

                HEMATOCRIT (BEAKER) (test code=411)    32.6 %          34.1-44.9        

 

                MEAN CORPUSCULAR VOLUME (BEAKER) (test code=753)    90.1 fL         79.4-94.8        

 

                MEAN CORPUSCULAR HEMOGLOBIN (BEAKER) (test code=751)    30.4 pg         25.6-32.2        

 

                    MEAN CORPUSCULAR HEMOGLOBIN CONC (BEAKER) (test code=752)    33.7 GM/DL          32.2-35.5

                                         

 

                RED CELL DISTRIBUTION WIDTH (BEAKER) (test code=412)    12.5 %          11.7-14.4        

 

                PLATELET COUNT (BEAKER) (test code=756)    190 K/CU MM     150-450          

 

                MEAN PLATELET VOLUME (BEAKER) (test code=754)    10.4 fL         9.4-12.3         

 

                NUCLEATED RED BLOOD CELLS (BEAKER) (test code=413)    0 /100 WBC      0-0              

 

                NEUTROPHILS RELATIVE PERCENT (BEAKER) (test code=429)    51 %                             

 

                LYMPHOCYTES RELATIVE PERCENT (BEAKER) (test code=430)    34 %                             

 

                MONOCYTES RELATIVE PERCENT (BEAKER) (test code=431)    10 %                             

 

                EOSINOPHILS RELATIVE PERCENT (BEAKER) (test code=432)    5 %                              

 

                BASOPHILS RELATIVE PERCENT (BEAKER) (test code=437)    1 %                              

 

                NEUTROPHILS ABSOLUTE COUNT (BEAKER) (test code=670)    3.15 K/ L       1.56-6.13        

 

                LYMPHOCYTES ABSOLUTE COUNT (BEAKER) (test code=414)    2.11 K/ L       1.18-3.74        

 

                MONOCYTES ABSOLUTE COUNT (BEAKER) (test code=415)    0.60 K/ L       0.24-0.36        

 

                EOSINOPHILS ABSOLUTE COUNT (BEAKER) (test code=416)    0.28 K/ L       0.04-0.36        

 

                BASOPHILS ABSOLUTE COUNT (BEAKER) (test code=417)    0.04 K/ L       0.01-0.08        

 

                IMMATURE GRANULOCYTES-RELATIVE PERCENT (BEAKER) (test code=2801)    1 %             0-1              





POCT-GLUCOSE [id number] 21:39:00* 





                Test Item       Value           Reference Range    Comments

 

                POC-GLUCOSE METER (BEAKER) (test code=1538)    201 mg/dL                 TESTED AT [facility] 

[address] [address]





POCT-GLUCOSE [id number] 17:37:00* 





                Test Item       Value           Reference Range    Comments

 

                POC-GLUCOSE METER (BEAKER) (test code=1538)    218 mg/dL                 TESTED AT [place] [id number] [facility] [address]





CREATINE KINASE (CK), TOTAL AND [id number] 12:42:00* 





                Test Item       Value           Reference Range    Comments

 

                CREATINE KINASE TOTAL (BEAKER) (test code=380)    90 U/L                     

 

                CREATINE KINASE-MB (BEAKER) (test code=750)    1.4 ng/mL       0.0-6.6          

 

                CREATINE KINASE-MB INDEX (BEAKER) (test code=395)    1.6 %                            





CK-MB Reference Range:<6.7      Normal6.7-10.0  Borderline>10.0     Abnormal
TROPONIN -53-04 12:42:00* 





                Test Item       Value           Reference Range    Comments

 

                TROPONIN I (BEAKER) (test code=397)    0.08 ng/mL      0.00-0.03        





Troponin I (TnI) levels must be interpreted in the context of the presenting sym
ptoms and the clinical findings. Elevated TnI levels indicate myocardial damage,
but are not specific for ischemic heart disease. Elevated TnI levels are seen in
patients with other cardiac conditions (including myocarditis and congestive h
eart failure), and slight TnI elevations occur in patients with other conditions
, including sepsis, renal failure, acidosis, acute neurological disease, and per
sistent tachyarrhythmia.POCT-GLUCOSE [id number] 11:50:00* 





                Test Item       Value           Reference Range    Comments

 

                POC-GLUCOSE METER (ALEAH) (test code=1538)    173 mg/dL                 TESTED AT [address] [address]





SEDIMENTATION [id number] 11:28:00* 





                Test Item       Value           Reference Range    Comments

 

                SEDIMENTATION RATE, ERYTHROCYTE (MAMADOUAKER) (test code=766)    38 mm/HR        0-40             





HEMOGLOBIN [id number] 11:12:00* 





                Test Item       Value           Reference Range    Comments

 

                HEMOGLOBIN A1C (BEAKER) (test code=368)    7.1 %           4.3-6.1          





POCT-GLUCOSE [id number] 07:39:00* 





                Test Item       Value           Reference Range    Comments

 

                POC-GLUCOSE METER (ALEAH) (test code=1538)    212 mg/dL                 TESTED AT [address] [address]





TSH/FREE T4 IF [id number] 05:39:00* 





                Test Item       Value           Reference Range    Comments

 

                THYROID STIMULATING HORMONE (BEAKER) (test code=772)    1.08 uIU/mL     0.35-4.94        





CREATINE KINASE (CK), TOTAL AND [id number] 05:32:00* 





                Test Item       Value           Reference Range    Comments

 

                CREATINE KINASE TOTAL (BEAKER) (test code=380)    97 U/L                     

 

                CREATINE KINASE-MB (BEAKER) (test code=750)    1.9 ng/mL       0.0-6.6          

 

                CREATINE KINASE-MB INDEX (BEAKER) (test code=395)    2.0 %                            





CK-MB Reference Range:<6.7      Normal6.7-10.0  Borderline>10.0     Abnormal
TROPONIN -83-25 05:32:00* 





                Test Item       Value           Reference Range    Comments

 

                TROPONIN I (BEAKER) (test code=397)    0.10 ng/mL      0.00-0.03        





Troponin I (TnI) levels must be interpreted in the context of the presenting sym
ptoms and the clinical findings. Elevated TnI levels indicate myocardial damage,
but are not specific for ischemic heart disease. Elevated TnI levels are seen in
patients with other cardiac conditions (including myocarditis and congestive h
eart failure), and slight TnI elevations occur in patients with other conditions
, including sepsis, renal failure, acidosis, acute neurological disease, and per
sistent tachyarrhythmia.[id number] 05:25:00* 





                Test Item       Value           Reference Range    Comments

 

                PHOSPHORUS (BEAKER) (test code=604)    3.1 mg/dL       2.3-4.7          





[id number] 05:25:00* 





                Test Item       Value           Reference Range    Comments

 

                MAGNESIUM (BEAKER) (test code=627)    1.8 mg/dL       1.6-2.6          





BASIC METABOLIC [id number] 05:25:00* 





                Test Item       Value           Reference Range    Comments

 

                SODIUM (BEAKER) (test code=381)    134 meq/L       136-145          

 

                POTASSIUM (BEAKER) (test code=379)    3.2 meq/L       3.5-5.1          

 

                CHLORIDE (BEAKER) (test code=382)    94 meq/L                   

 

                CO2 (BEAKER) (test code=355)    30 meq/L        22-29            

 

                BLOOD UREA NITROGEN (BEAKER) (test code=354)    16 mg/dL        7-21             

 

                CREATININE (BEAKER) (test code=358)    0.79 mg/dL      0.57-1.25        

 

                GLUCOSE RANDOM (BEAKER) (test code=652)    184 mg/dL                  

 

                CALCIUM (BEAKER) (test code=697)    8.8 mg/dL       8.4-10.2         

 

                EGFR (BEAKER) (test code=1092)    72 mL/min/1.73 sq m                    ESTIMATED GFR IS NOT AS 

ACCURATE AS CREATININE CLEARANCE IN PREDICTING GLOMERULAR FILTRATION RATE. 
ESTIMATED GFR IS NOT APPLICABLE FOR DIALYSIS PATIENTS.





LIPID [id number] 05:25:00* 





                Test Item       Value           Reference Range    Comments

 

                TRIGLYCERIDES (BEAKER) (test code=540)    238 mg/dL                        

 

                CHOLESTEROL (BEAKER) (test code=631)    184 mg/dL                        

 

                HDL CHOLESTEROL (BEAKER) (test code=976)    37 mg/dL                         

 

                LDL CHOLESTEROL CALCULATED (BEAKER) (test code=633)    99 mg/dL                         





Triglyceride Reference Range:   Low Risk         <150   Borderline    150-199   
High Risk     200-499   Very High Risk  >=500Cholesterol Reference Range:   Low 
Risk         <200   Borderline    200-239    High Risk        >240HDL 
Cholesterol Reference Range:   Low Risk         >=60   High Risk         <40LDL 
Cholesterol Reference Range:   Optimal          <100   Near Optimal  100-129   
Borderline    130-159   High          160-189   Very High       >=190   HEPATIC 
FUNCTION [id number] 05:25:00* 





                Test Item       Value           Reference Range    Comments

 

                TOTAL PROTEIN (BEAKER) (test code=770)    6.0 gm/dL       6.0-8.3          

 

                ALBUMIN (BEAKER) (test code=1145)    3.5 g/dL        3.5-5.0          

 

                BILIRUBIN TOTAL (BEAKER) (test code=377)    0.6 mg/dL       0.2-1.2          

 

                BILIRUBIN DIRECT (BEAKER) (test code=706)    0.2 mg/dL       0.1-0.5          

 

                ALKALINE PHOSPHATASE (BEAKER) (test code=346)    62 U/L                     

 

                AST (SGOT) (BEAKER) (test code=353)    22 U/L          5-34             

 

                ALT (SGPT) (BEAKER) (test code=347)    28 U/L          6-55             





C-REACTIVE [id number] 05:25:00* 





                Test Item       Value           Reference Range    Comments

 

                C-REACTIVE PROTEIN (BEAKER) (test code=676)    0.16 mg/dL      0.00-0.50        





CBC W/PLT COUNT & AUTO [id number] 04:58:00* 





                Test Item       Value           Reference Range    Comments

 

                WHITE BLOOD CELL COUNT (BEAKER) (test code=775)    10.1 K/ L       3.5-10.5         

 

                RED BLOOD CELL COUNT (BEAKER) (test code=761)    3.36 M/ L       3.93-5.22        

 

                HEMOGLOBIN (BEAKER) (test code=410)    10.5 GM/DL      11.2-15.7        

 

                HEMATOCRIT (BEAKER) (test code=411)    30.5 %          34.1-44.9        

 

                MEAN CORPUSCULAR VOLUME (BEAKER) (test code=753)    90.8 fL         79.4-94.8        

 

                MEAN CORPUSCULAR HEMOGLOBIN (BEAKER) (test code=751)    31.3 pg         25.6-32.2        

 

                    MEAN CORPUSCULAR HEMOGLOBIN CONC (BEAKER) (test code=752)    34.4 GM/DL          32.2-35.5

                                         

 

                RED CELL DISTRIBUTION WIDTH (BEAKER) (test code=412)    12.9 %          11.7-14.4        

 

                PLATELET COUNT (BEAKER) (test code=756)    189 K/CU MM     150-450          

 

                MEAN PLATELET VOLUME (BEAKER) (test code=754)    10.5 fL         9.4-12.3         

 

                NUCLEATED RED BLOOD CELLS (BEAKER) (test code=413)    0 /100 WBC      0-0              

 

                NEUTROPHILS RELATIVE PERCENT (BEAKER) (test code=429)    71 %                             

 

                LYMPHOCYTES RELATIVE PERCENT (BEAKER) (test code=430)    19 %                             

 

                MONOCYTES RELATIVE PERCENT (BEAKER) (test code=431)    8 %                              

 

                EOSINOPHILS RELATIVE PERCENT (BEAKER) (test code=432)    1 %                              

 

                BASOPHILS RELATIVE PERCENT (BEAKER) (test code=437)    0 %                              

 

                NEUTROPHILS ABSOLUTE COUNT (BEAKER) (test code=670)    7.10 K/ L       1.56-6.13        

 

                LYMPHOCYTES ABSOLUTE COUNT (BEAKER) (test code=414)    1.94 K/ L       1.18-3.74        

 

                MONOCYTES ABSOLUTE COUNT (BEAKER) (test code=415)    0.79 K/ L       0.24-0.36        

 

                EOSINOPHILS ABSOLUTE COUNT (BEAKER) (test code=416)    0.14 K/ L       0.04-0.36        

 

                BASOPHILS ABSOLUTE COUNT (BEAKER) (test code=417)    0.03 K/ L       0.01-0.08        

 

                IMMATURE GRANULOCYTES-RELATIVE PERCENT (BEAKER) (test code=2801)    1 %             0-1              





POCT-GLUCOSE [id number] 01:44:00* 





                Test Item       Value           Reference Range    Comments

 

                POC-GLUCOSE METER (BEAKER) (test code=1538)    178 mg/dL                 TESTED AT [facility] 

[address] [facility] [address]





[id number] 22:16:00* 





                Test Item       Value           Reference Range    Comments

 

                PROTIME (BEAKER) (test code=759)    13.9 seconds    11.7-14.7        

 

                INR (BEAKER) (test code=370)    1.1             <=5.9            

 

                PARTIAL THROMBOPLASTIN TIME (BEAKER) (test code=760)    21.1 seconds    22.5-36.0        







RECOMMENDED COUMADIN/WARFARIN INR THERAPY RANGESSTANDARD DOSE: 2.0 - 3.0   Inclu
agustin: PROPHYLAXIS for venous thrombosis, systemic embolization; TREATMENT for adam
ous thrombosis and/or pulmonary embolus.HIGH RISK: Target INR is 2.5-3.5 for pat
ients with mechanical heart valves.RAD, CHEST, 1 VIEW, NON [id number] 
22:01:00Reason for exam:->CHEST PAINFINAL REPORT PATIENT ID:   [id number]  
Comparison examination: CT scan of the chest 2002 No pneumothorax, focal 
pulmonary consolidation, or significant pleural effusion.  The cardiac size is 
magnified by the portable technique. Mediastinal surgical clips. Normal 
mediastinal contours. Atherosclerotic aortic calcification. Normal skeleton and 
soft tissues.       Impression: No acute abnormality. Signed: Jose Alfredo Isabel 
MDReport Verified Date/Time:  2018 22:01:31 Reading Location: [address] Reading Room      Electronically signed by: JOSE ALFREDO ISABEL M.D. on
2018 10:01 PM KETONE, [id number] 21:43:00* 





                Test Item       Value           Reference Range    Comments

 

                KETONES, BLOOD (BEAKER) (test code=1103)    0.1 mmol/L      <0.4             





TROPONIN -12-53 21:08:00* 





                Test Item       Value           Reference Range    Comments

 

                TROPONIN I (BEAKER) (test code=397)    0.02 ng/mL      0.00-0.03        





Troponin I (TnI) levels must be interpreted in the context of the presenting sym
ptoms and the clinical findings. Elevated TnI levels indicate myocardial damage,
but are not specific for ischemic heart disease. Elevated TnI levels are seen in
patients with other cardiac conditions (including myocarditis and congestive h
eart failure), and slight TnI elevations occur in patients with other conditions
, including sepsis, renal failure, acidosis, acute neurological disease, and per
sistent tachyarrhythmia.HEPATIC FUNCTION [id number] 21:01:00* 





                Test Item       Value           Reference Range    Comments

 

                TOTAL PROTEIN (BEAKER) (test code=770)    6.7 gm/dL       6.0-8.3          

 

                ALBUMIN (BEAKER) (test code=1145)    3.9 g/dL        3.5-5.0          

 

                BILIRUBIN TOTAL (BEAKER) (test code=377)    0.6 mg/dL       0.2-1.2          

 

                BILIRUBIN DIRECT (BEAKER) (test code=706)    0.2 mg/dL       0.1-0.5          

 

                ALKALINE PHOSPHATASE (BEAKER) (test code=346)    77 U/L                     

 

                AST (SGOT) (BEAKER) (test code=353)    20 U/L          5-34             

 

                ALT (SGPT) (BEAKER) (test code=347)    32 U/L          6-55             





BASIC METABOLIC [id number] 21:01:00* 





                Test Item       Value           Reference Range    Comments

 

                SODIUM (BEAKER) (test code=381)    132 meq/L       136-145          

 

                POTASSIUM (BEAKER) (test code=379)    3.0 meq/L       3.5-5.1          

 

                CHLORIDE (BEAKER) (test code=382)    94 meq/L                   

 

                CO2 (BEAKER) (test code=355)    23 meq/L        22-29            

 

                BLOOD UREA NITROGEN (BEAKER) (test code=354)    20 mg/dL        7-21             

 

                CREATININE (BEAKER) (test code=358)    0.99 mg/dL      0.57-1.25        

 

                GLUCOSE RANDOM (BEAKER) (test code=652)    246 mg/dL                  

 

                CALCIUM (BEAKER) (test code=697)    8.8 mg/dL       8.4-10.2         

 

                EGFR (BEAKER) (test code=1092)    56 mL/min/1.73 sq m                    ESTIMATED GFR IS NOT AS 

ACCURATE AS CREATININE CLEARANCE IN PREDICTING GLOMERULAR FILTRATION RATE. 
ESTIMATED GFR IS NOT APPLICABLE FOR DIALYSIS PATIENTS.





[id number] 21:01:00* 





                Test Item       Value           Reference Range    Comments

 

                MAGNESIUM (BEAKER) (test code=627)    1.6 mg/dL       1.6-2.6          





[id number] 21:01:00* 





                Test Item       Value           Reference Range    Comments

 

                LIPASE (BEAKER) (test code=749)    54 U/L          8-78             





CBC W/PLT COUNT & AUTO [id number] 20:42:00* 





                Test Item       Value           Reference Range    Comments

 

                WHITE BLOOD CELL COUNT (BEAKER) (test code=775)    7.0 K/ L        3.5-10.5         

 

                RED BLOOD CELL COUNT (BEAKER) (test code=761)    3.69 M/ L       3.93-5.22        

 

                HEMOGLOBIN (BEAKER) (test code=410)    11.8 GM/DL      11.2-15.7        

 

                HEMATOCRIT (BEAKER) (test code=411)    33.3 %          34.1-44.9        

 

                MEAN CORPUSCULAR VOLUME (BEAKER) (test code=753)    90.2 fL         79.4-94.8        

 

                MEAN CORPUSCULAR HEMOGLOBIN (BEAKER) (test code=751)    32.0 pg         25.6-32.2        

 

                    MEAN CORPUSCULAR HEMOGLOBIN CONC (BEAKER) (test code=752)    35.4 GM/DL          32.2-35.5

                                         

 

                RED CELL DISTRIBUTION WIDTH (BEAKER) (test code=412)    12.7 %          11.7-14.4        

 

                PLATELET COUNT (BEAKER) (test code=756)    223 K/CU MM     150-450          

 

                MEAN PLATELET VOLUME (BEAKER) (test code=754)    10.1 fL         9.4-12.3         

 

                NUCLEATED RED BLOOD CELLS (BEAKER) (test code=413)    0 /100 WBC      0-0              

 

                NEUTROPHILS RELATIVE PERCENT (BEAKER) (test code=429)    62 %                             

 

                LYMPHOCYTES RELATIVE PERCENT (BEAKER) (test code=430)    27 %                             

 

                MONOCYTES RELATIVE PERCENT (BEAKER) (test code=431)    9 %                              

 

                EOSINOPHILS RELATIVE PERCENT (BEAKER) (test code=432)    2 %                              

 

                BASOPHILS RELATIVE PERCENT (BEAKER) (test code=437)    0 %                              

 

                NEUTROPHILS ABSOLUTE COUNT (BEAKER) (test code=670)    4.28 K/ L       1.56-6.13        

 

                LYMPHOCYTES ABSOLUTE COUNT (BEAKER) (test code=414)    1.85 K/ L       1.18-3.74        

 

                MONOCYTES ABSOLUTE COUNT (BEAKER) (test code=415)    0.63 K/ L       0.24-0.36        

 

                EOSINOPHILS ABSOLUTE COUNT (BEAKER) (test code=416)    0.14 K/ L       0.04-0.36        

 

                BASOPHILS ABSOLUTE COUNT (BEAKER) (test code=417)    0.03 K/ L       0.01-0.08        

 

                IMMATURE GRANULOCYTES-RELATIVE PERCENT (BEAKER) (test code=2801)    0 %             0-1

## 2020-03-18 NOTE — NUR
1445pm RECEIVING NOTE CATH LAB RECOVERY 
DEPT...............................................................





Bedside report received from MILES Noel. Identifierx2. Alert oriented and appropriate, 
PERRLA, respirations even and unlabored to room air. Pulses x4 extremities equal and strong. 
Pedal pulses PT/DP X4 and marked. Cap fill brisk < 3 sec. Rt arm site No gross issues 
pain,pallor pressure or dysrhythmia



Skin warm and dry integrity appears XXX. IV 20g to XXX  presents healthy w/o s/s of 
infiltration or complaint. Abdomen soft and supple. pt offered toileting, denies need to 
urinate or defecate. No personal affects with patient.  Family XXXXX. Pt and family 
verbalizes understanding of POC. 



Currently w/o complaint of pain or need. vidya/rn

## 2020-03-18 NOTE — NUR
1545p RADIAL COMPRESSION REMOVAL NOTE:     Initial  Cuff  volume   10  cc



           1545p -2cc Removed  No hematoma/bleeding noted with normal  neurovascular 
function.



           1600p  -5cc Removed  No hematoma/ bleeding noted with normal  neurovascular 
function.



            1615p  -5cc Removed  No hematoma/bleeding noted with normal  neurovascular 
function.



           



Air removal completed. 

Stasis achieved sterile 2x2,Tegaderm,  Coban  dressing  No hematoma, bleeding noted with 
normal neurovascular function. Wrist splint in place. Pt instructed on POC.

Ds/Rn

## 2020-03-18 NOTE — NUR
1630pm CATH LAB RECOVERY  DISCHARGE NURSING 
NOTE--------------------------------------------------------



Pt meets DC criteria. Skin assessed for s/s of complication and presence of hematoma. Skin 
warm, dry, no discolor, and pulses present. IV removed from left hand. Distal tip appears 
intact. VS WNL. Pt denies pain, sob, or need at this time. Family at bedside. Review of 
discharge paperwork and follow up instructions. verbalized understanding. Pt to wheelchair 
and transported to front of hospital. Transferred to private vehicle under own strength w/o 
incident with DC paperwork in hand. - vidya/rn

## 2020-04-23 NOTE — OPERATIVE REPORT
DATE OF PROCEDURE:  

 

SURGEON:  Wade Lange MD

 

INDICATIONS FOR PROCEDURE:  Preoperative cardiovascular assessment, history of CAD.

 

PREPROCEDURE ASSESSMENT:  The risks, benefits, and alternatives to treatment were

explained to the patient prior to the procedure.  The patient deemed to be an

appropriate candidate for moderate sedation.  Informed consent was obtained and

documented in the medical record. 

 

MEDICATIONS:  Please see nursing notes for list of medications administered throughout

the procedure. 

 

PROCEDURES PERFORMED:  

1. Coronary angiography.

2. Bypass graft angiography.

 

PROCEDURE IN DETAIL:  The patient was brought to the cardiac catheterization laboratory

in a fasting state.  The right groin was prepped and draped in a sterile fashion.  A

6-[place] sheath was inserted into the right common femoral artery using modified

Seldinger technique and micropuncture equipment.  Coronary angiography was performed

with JL4 and JR4.  A 5-[place] diagnostic catheters, bypass graft angiography was

performed using 5-[place] SANG catheter to engage the LIMA.  All the SVG grafts were known

to be occluded from previous cath.  All catheter was removed over a wire.  Access site

was closed using a vascular closure device.  The patient tolerated the procedure well.

There were no immediate complications. 

 

SIGNIFICANT FINDINGS:  Left main, large vessel, calcified, mild CAD.  LAD is completely

occluded in the midportion.  Mid to distal LAD fills via LIMA to LAD graft.  Left

circumflex, nondominant circumflex, patent stent in the midportion, one large OM branch

provides collaterals to the posterolateral system.  RCA, 100% occluded, chronic total

occlusion in the midportion.  Distal RCA fills via collaterals from the LAD and left

circumflex systems.  CALDERON to LAD, widely patent graft anastomosed to the mid LAD. 

 

GRAFTS AND IMPLANTS:  None.

 

SPECIMEN REMOVED:  None.

 

ESTIMATED BLOOD LOSS:  20 mL.

 

COMPLICATIONS:  None.

 

FINAL RECOMMENDATIONS:  

1. The patient is cleared from cardiovascular standpoint to proceed with surgery.

2. Follow up in clinic 2 weeks post procedure.

 

 

 

______________________________

Wade Lange MD

 

KVP/MODL

D:  04/23/2020 09:11:02

T:  04/23/2020 11:01:43

Job #:  [id number]

## 2023-05-19 LAB
BASOPHILS # BLD AUTO: 0 10*3/UL (ref 0–0.1)
BASOPHILS NFR BLD AUTO: 0.2 % (ref 0–1)
DEPRECATED NEUTROPHILS # BLD AUTO: 3 10*3/UL (ref 2.1–6.9)
EOSINOPHIL # BLD AUTO: 0.1 10*3/UL (ref 0–0.4)
EOSINOPHIL NFR BLD AUTO: 2.2 % (ref 0–6)
ERYTHROCYTE [DISTWIDTH] IN CORD BLOOD: 13.2 % (ref 11.7–14.4)
HCT VFR BLD AUTO: 35.8 % (ref 34.2–44.1)
HGB BLD-MCNC: 11.6 G/DL (ref 12–16)
LYMPHOCYTES # BLD: 1.4 10*3/UL (ref 1–3.2)
LYMPHOCYTES NFR BLD AUTO: 28.6 % (ref 18–39.1)
MCH RBC QN AUTO: 30.9 PG (ref 28–32)
MCHC RBC AUTO-ENTMCNC: 32.4 G/DL (ref 31–35)
MCV RBC AUTO: 95.2 FL (ref 81–99)
MONOCYTES # BLD AUTO: 0.4 10*3/UL (ref 0.2–0.8)
MONOCYTES NFR BLD AUTO: 7.4 % (ref 4.4–11.3)
NEUTS SEG NFR BLD AUTO: 61.2 % (ref 38.7–80)
PLATELET # BLD AUTO: 181 X10E3/UL (ref 140–360)
RBC # BLD AUTO: 3.76 X10E6/UL (ref 3.6–5.1)

## 2023-05-23 ENCOUNTER — HOSPITAL ENCOUNTER (OUTPATIENT)
Dept: HOSPITAL 88 - OR | Age: 73
Discharge: HOME | End: 2023-05-23
Attending: INTERNAL MEDICINE
Payer: MEDICARE

## 2023-05-23 VITALS
DIASTOLIC BLOOD PRESSURE: 68 MMHG | SYSTOLIC BLOOD PRESSURE: 134 MMHG | RESPIRATION RATE: 17 BRPM | OXYGEN SATURATION: 98 % | HEART RATE: 70 BPM

## 2023-05-23 DIAGNOSIS — E11.9: ICD-10-CM

## 2023-05-23 DIAGNOSIS — Z79.84: ICD-10-CM

## 2023-05-23 DIAGNOSIS — E78.5: ICD-10-CM

## 2023-05-23 DIAGNOSIS — Z80.0: ICD-10-CM

## 2023-05-23 DIAGNOSIS — I10: ICD-10-CM

## 2023-05-23 DIAGNOSIS — F32.A: ICD-10-CM

## 2023-05-23 DIAGNOSIS — K22.70: ICD-10-CM

## 2023-05-23 DIAGNOSIS — K29.50: ICD-10-CM

## 2023-05-23 DIAGNOSIS — K57.30: ICD-10-CM

## 2023-05-23 DIAGNOSIS — K62.89: ICD-10-CM

## 2023-05-23 DIAGNOSIS — Z79.82: ICD-10-CM

## 2023-05-23 DIAGNOSIS — K44.9: ICD-10-CM

## 2023-05-23 DIAGNOSIS — Z95.1: ICD-10-CM

## 2023-05-23 DIAGNOSIS — Z79.02: ICD-10-CM

## 2023-05-23 DIAGNOSIS — K21.9: Primary | ICD-10-CM

## 2023-05-23 DIAGNOSIS — F41.9: ICD-10-CM

## 2023-05-23 DIAGNOSIS — R15.9: ICD-10-CM

## 2023-05-23 DIAGNOSIS — I25.810: ICD-10-CM

## 2023-05-23 DIAGNOSIS — Z01.812: ICD-10-CM

## 2023-05-23 DIAGNOSIS — Z01.810: ICD-10-CM

## 2023-05-23 DIAGNOSIS — M06.9: ICD-10-CM

## 2023-05-23 DIAGNOSIS — Z79.899: ICD-10-CM

## 2023-05-23 PROCEDURE — 45380 COLONOSCOPY AND BIOPSY: CPT

## 2023-05-23 PROCEDURE — 88342 IMHCHEM/IMCYTCHM 1ST ANTB: CPT

## 2023-05-23 PROCEDURE — 93005 ELECTROCARDIOGRAM TRACING: CPT

## 2023-05-23 PROCEDURE — 88305 TISSUE EXAM BY PATHOLOGIST: CPT

## 2023-05-23 PROCEDURE — 36415 COLL VENOUS BLD VENIPUNCTURE: CPT

## 2023-05-23 PROCEDURE — 88312 SPECIAL STAINS GROUP 1: CPT

## 2023-05-23 PROCEDURE — 85025 COMPLETE CBC W/AUTO DIFF WBC: CPT

## 2023-05-23 PROCEDURE — 88304 TISSUE EXAM BY PATHOLOGIST: CPT

## 2023-05-23 PROCEDURE — 82948 REAGENT STRIP/BLOOD GLUCOSE: CPT

## 2023-05-23 PROCEDURE — 43239 EGD BIOPSY SINGLE/MULTIPLE: CPT

## 2023-05-23 PROCEDURE — 45378 DIAGNOSTIC COLONOSCOPY: CPT
